# Patient Record
Sex: FEMALE | Race: WHITE | NOT HISPANIC OR LATINO | Employment: OTHER | URBAN - METROPOLITAN AREA
[De-identification: names, ages, dates, MRNs, and addresses within clinical notes are randomized per-mention and may not be internally consistent; named-entity substitution may affect disease eponyms.]

---

## 2019-11-18 ENCOUNTER — OFFICE VISIT (OUTPATIENT)
Dept: PRIMARY CARE CLINIC | Facility: CLINIC | Age: 69
End: 2019-11-18
Payer: COMMERCIAL

## 2019-11-18 VITALS
HEART RATE: 58 BPM | WEIGHT: 148 LBS | DIASTOLIC BLOOD PRESSURE: 80 MMHG | HEIGHT: 68 IN | SYSTOLIC BLOOD PRESSURE: 120 MMHG | BODY MASS INDEX: 22.43 KG/M2

## 2019-11-18 DIAGNOSIS — Z86.79 HISTORY OF SUPRAVENTRICULAR TACHYCARDIA: ICD-10-CM

## 2019-11-18 DIAGNOSIS — E03.9 HYPOTHYROIDISM (ACQUIRED): ICD-10-CM

## 2019-11-18 DIAGNOSIS — E78.2 HYPERLIPIDEMIA, MIXED: Primary | ICD-10-CM

## 2019-11-18 DIAGNOSIS — E55.9 VITAMIN D DEFICIENCY: ICD-10-CM

## 2019-11-18 DIAGNOSIS — Z79.890 POSTMENOPAUSAL HRT (HORMONE REPLACEMENT THERAPY): ICD-10-CM

## 2019-11-18 DIAGNOSIS — F33.0 MILD RECURRENT MAJOR DEPRESSION: ICD-10-CM

## 2019-11-18 PROCEDURE — 99999 PR PBB SHADOW E&M-NEW PATIENT-LVL III: ICD-10-PCS | Mod: PBBFAC,,, | Performed by: INTERNAL MEDICINE

## 2019-11-18 PROCEDURE — 99214 OFFICE O/P EST MOD 30 MIN: CPT | Mod: S$GLB,,, | Performed by: INTERNAL MEDICINE

## 2019-11-18 PROCEDURE — 99999 PR PBB SHADOW E&M-NEW PATIENT-LVL III: CPT | Mod: PBBFAC,,, | Performed by: INTERNAL MEDICINE

## 2019-11-18 PROCEDURE — 99214 PR OFFICE/OUTPT VISIT, EST, LEVL IV, 30-39 MIN: ICD-10-PCS | Mod: S$GLB,,, | Performed by: INTERNAL MEDICINE

## 2019-11-18 RX ORDER — ACETAMINOPHEN 500 MG
1 TABLET ORAL DAILY
COMMUNITY
Start: 2019-05-31

## 2019-11-18 RX ORDER — CITALOPRAM 10 MG/1
10 TABLET ORAL DAILY
Qty: 90 TABLET | Refills: 3 | Status: SHIPPED | OUTPATIENT
Start: 2019-11-18 | End: 2020-12-28 | Stop reason: SDUPTHER

## 2019-11-18 RX ORDER — NORETHINDRONE ACETATE AND ETHINYL ESTRADIOL 1; 5 MG/1; UG/1
TABLET ORAL
COMMUNITY
Start: 2019-10-24

## 2019-11-18 RX ORDER — METOPROLOL TARTRATE 100 MG/1
1 TABLET ORAL 2 TIMES DAILY
Refills: 3 | COMMUNITY
Start: 2019-10-03 | End: 2024-01-26

## 2019-11-18 RX ORDER — MOMETASONE FUROATE 1 MG/G
CREAM TOPICAL
COMMUNITY
Start: 2019-09-11

## 2019-11-18 RX ORDER — CITALOPRAM 10 MG/1
10 TABLET ORAL DAILY
COMMUNITY
End: 2019-11-18 | Stop reason: SDUPTHER

## 2019-11-18 RX ORDER — LEVOTHYROXINE SODIUM 25 UG/1
25 TABLET ORAL DAILY
Refills: 4 | COMMUNITY
Start: 2019-09-17 | End: 2020-03-09 | Stop reason: SDUPTHER

## 2019-11-18 RX ORDER — PRAVASTATIN SODIUM 40 MG/1
40 TABLET ORAL DAILY
COMMUNITY
Start: 2016-04-05

## 2019-11-18 NOTE — PROGRESS NOTES
[facility] Note    Chief Complaint      Chief Complaint   Patient presents with    Establish Care       History of Present Illness      Fifi Castano is a 69 y.o. female with chronic conditions of HLD, hx of SVT, hypothyroidism, depression, vit D deficiency who presents today for: establish care and review chronic conditions.    HLD: Contorlled on pravastatin.  Sees Dr. Ordonez.  .    Hx of SVT: Sees Dr. Ordonez.  On metoprolol for rate control.  No recent exacerbation.  Hypothyroidism: Will be seeing Dr. Nevarez since Dr. Wang retired.  Controlled on synthroid.  TSH due.  No new or worsening symptoms.  Depression: Controlled on citalopram.  Tried stopping to see if she still needs it.  After discontinuing, felt like she did better on it.    Vit D deficiency: Controlled on vit D supplement.  Will update labs.  Flu shot UTD.  Td unsure.  Prevnar, pneumovax unsure.  Shingles vaccine unsure.  Mammogram UTD with DR. Wang.  DEXA UTD.  Cscope UTD with DR. Pedro.      Past Medical History:  History reviewed. No pertinent past medical history.    Past Surgical History:   has no past surgical history on file.    Family History:  family history is not on file.     Social History:  Social History     Tobacco Use    Smoking status: Never Smoker    Smokeless tobacco: Never Used   Substance Use Topics    Alcohol use: Yes     Frequency: 2-4 times a month    Drug use: Not on file       Review of Systems   Constitutional: Negative for chills, fever and malaise/fatigue.   Respiratory: Negative for shortness of breath.    Cardiovascular: Negative for chest pain.   Gastrointestinal: Negative for constipation, diarrhea, nausea and vomiting.   Skin: Negative for rash.   Neurological: Negative for weakness.        Medications:  Outpatient Encounter Medications as of 11/18/2019   Medication Sig Dispense Refill    cholecalciferol, vitamin D3, (VITAMIN D3) 50 mcg (2,000 unit) Cap Take 1 capsule by mouth once  "daily.      citalopram (CELEXA) 10 MG tablet Take 1 tablet (10 mg total) by mouth once daily. 90 tablet 3    JINTELI 1-5 mg-mcg Tab       levothyroxine (SYNTHROID) 25 MCG tablet Take 25 mcg by mouth once daily.  4    metoprolol tartrate (LOPRESSOR) 100 MG tablet Take 1 tablet by mouth 2 (two) times daily.  3    pravastatin (PRAVACHOL) 20 MG tablet Take 1 tablet by mouth once daily.      [DISCONTINUED] citalopram (CELEXA) 10 MG tablet Take 10 mg by mouth once daily.      [DISCONTINUED] citalopram hydrobromide (CELEXA ORAL) Take 10 mg by mouth once daily.       mometasone 0.1% (ELOCON) 0.1 % cream        No facility-administered encounter medications on file as of 11/18/2019.        Allergies:  Review of patient's allergies indicates:  No Known Allergies    Health Maintenance:    There is no immunization history on file for this patient.   Health Maintenance   Topic Date Due    Hepatitis C Screening  1950    Lipid Panel  1950    TETANUS VACCINE  04/29/1970    Mammogram  04/29/1992    DEXA SCAN  04/29/1992    Colonoscopy  04/29/2002    Pneumococcal Vaccine (65+ Low/Medium Risk) (1 of 2 - PCV13) 04/29/2017        Physical Exam      Vital Signs  Pulse: (!) 58  BP: 120/80  Pain Score: 0-No pain  Height and Weight  Height: 5' 7.5" (171.5 cm)  Weight: 67.1 kg (148 lb)  BSA (Calculated - sq m): 1.79 sq meters  BMI (Calculated): 22.8  Weight in (lb) to have BMI = 25: 161.7]    Physical Exam   Constitutional: She appears well-developed and well-nourished.   HENT:   Head: Normocephalic and atraumatic.   Right Ear: External ear normal.   Left Ear: External ear normal.   Mouth/Throat: Oropharynx is clear and moist.   Eyes: Pupils are equal, round, and reactive to light. Conjunctivae and EOM are normal.   Neck: Carotid bruit is not present.   Cardiovascular: Normal rate, regular rhythm, normal heart sounds and intact distal pulses.   No murmur heard.  Pulmonary/Chest: Effort normal and breath sounds " normal. She has no wheezes. She has no rales.   Abdominal: Soft. Bowel sounds are normal. She exhibits no distension. There is no hepatosplenomegaly. There is no tenderness.   Musculoskeletal: She exhibits no edema.   Vitals reviewed.       Laboratory:  CBC:      CMP:        Invalid input(s): CREATININ  URINALYSIS:       LIPIDS:      TSH:      A1C:        Assessment/Plan     Fifi Castano is a 69 y.o.female with:    1. Hyperlipidemia, mixed  - TSH; Future  - T4, free; Future  - Basic metabolic panel; Future  - TSH  - T4, free  - Basic metabolic panel  Continue current meds.    2. Hypothyroidism (acquired)  - TSH; Future  - T4, free; Future  - Basic metabolic panel; Future  - TSH  - T4, free  - Basic metabolic panel  Continue current meds.    3. Mild recurrent major depression  - citalopram (CELEXA) 10 MG tablet; Take 1 tablet (10 mg total) by mouth once daily.  Dispense: 90 tablet; Refill: 3  Continue current meds.    4. Vitamin D deficiency  - Vitamin D; Future  - Vitamin D  Continue current meds.    5. Postmenopausal HRT (hormone replacement therapy)    6. History of supraventricular tachycardia       Chronic conditions status updated as per HPI.  Other than changes above, cont current medications and maintain follow up with specialists.  Return to clinic in 12 months.    Atif Duran MD  [facility]

## 2019-11-20 LAB
25(OH)D3 SERPL-MCNC: 34 NG/ML (ref 30–100)
ANION GAP SERPL CALC-SCNC: 17 MEQ/L (ref 9–18)
BUN BLD-MCNC: 17 MG/DL (ref 7–21)
BUN/CREAT SERPL: 24 RATIO (ref 6–22)
CALC OSMOLALITY: 280 MOSM/KG (ref 275–295)
CALCIUM SERPL-MCNC: 9.7 MG/DL (ref 8.5–10.3)
CHLORIDE SERPL-SCNC: 103 MEQ/L (ref 98–107)
CO2 SERPL-SCNC: 25 MEQ/L (ref 21–31)
CREAT SERPL-MCNC: 0.7 MG/DL (ref 0.5–1)
FREE T4: 1.3 NANOGRAM/DL (ref 0.9–1.7)
GFR: 84.9 ML/MIN/1.73M2
GLUCOSE SERPL-MCNC: 89 MG/DL (ref 70–100)
POTASSIUM SERPL-SCNC: 4.7 MEQ/L (ref 3.5–5)
SODIUM BLD-SCNC: 140 MEQ/L (ref 135–145)
TSH SERPL DL<=0.005 MIU/L-ACNC: 1.79 UIL/ML (ref 0.35–4)

## 2020-03-07 ENCOUNTER — PATIENT MESSAGE (OUTPATIENT)
Dept: PRIMARY CARE CLINIC | Facility: CLINIC | Age: 70
End: 2020-03-07

## 2020-03-09 RX ORDER — LEVOTHYROXINE SODIUM 25 UG/1
25 TABLET ORAL DAILY
Qty: 90 TABLET | Refills: 3 | Status: SHIPPED | OUTPATIENT
Start: 2020-03-09 | End: 2020-12-28 | Stop reason: SDUPTHER

## 2020-05-08 DIAGNOSIS — Z12.11 COLON CANCER SCREENING: ICD-10-CM

## 2020-05-08 DIAGNOSIS — Z12.39 BREAST CANCER SCREENING: ICD-10-CM

## 2020-10-05 ENCOUNTER — PATIENT MESSAGE (OUTPATIENT)
Dept: INTERNAL MEDICINE | Facility: CLINIC | Age: 70
End: 2020-10-05

## 2020-11-23 ENCOUNTER — PATIENT MESSAGE (OUTPATIENT)
Dept: PRIMARY CARE CLINIC | Facility: CLINIC | Age: 70
End: 2020-11-23

## 2020-11-23 RX ORDER — INFLUENZA A VIRUS A/MICHIGAN/45/2015 X-275 (H1N1) ANTIGEN (FORMALDEHYDE INACTIVATED), INFLUENZA A VIRUS A/SINGAPORE/INFIMH-16-0019/2016 IVR-186 (H3N2) ANTIGEN (FORMALDEHYDE INACTIVATED), INFLUENZA B VIRUS B/PHUKET/3073/2013 ANTIGEN (FORMALDEHYDE INACTIVATED), AND INFLUENZA B VIRUS B/MARYLAND/15/2016 BX-69A ANTIGEN (FORMALDEHYDE INACTIVATED) 60; 60; 60; 60 UG/.7ML; UG/.7ML; UG/.7ML; UG/.7ML
INJECTION, SUSPENSION INTRAMUSCULAR
COMMUNITY
Start: 2020-09-28 | End: 2024-01-26

## 2020-11-23 RX ORDER — CELECOXIB 200 MG/1
CAPSULE ORAL
COMMUNITY
Start: 2020-10-06 | End: 2024-01-26

## 2020-11-23 RX ORDER — TRETINOIN 0.25 MG/G
1 CREAM TOPICAL NIGHTLY
COMMUNITY
Start: 2020-09-28 | End: 2024-01-26

## 2020-11-23 RX ORDER — VALACYCLOVIR HYDROCHLORIDE 500 MG/1
TABLET, FILM COATED ORAL
COMMUNITY
Start: 2020-08-21

## 2020-12-08 LAB
CHOL/HDLC RATIO: 5
CHOLEST SERPL-MSCNC: 235 MG/DL (ref 0–200)
HDLC SERPL-MCNC: 49 MG/DL
LDLC SERPL CALC-MCNC: 134 MG/DL
NON HDL CHOL (CALC): 186
TRIGLYCERIDE (LIPID PAN): 322

## 2020-12-28 ENCOUNTER — OFFICE VISIT (OUTPATIENT)
Dept: PRIMARY CARE CLINIC | Facility: CLINIC | Age: 70
End: 2020-12-28
Payer: COMMERCIAL

## 2020-12-28 ENCOUNTER — PATIENT MESSAGE (OUTPATIENT)
Dept: PRIMARY CARE CLINIC | Facility: CLINIC | Age: 70
End: 2020-12-28

## 2020-12-28 VITALS
SYSTOLIC BLOOD PRESSURE: 123 MMHG | OXYGEN SATURATION: 98 % | DIASTOLIC BLOOD PRESSURE: 72 MMHG | HEART RATE: 75 BPM | RESPIRATION RATE: 18 BRPM

## 2020-12-28 DIAGNOSIS — Z79.890 POSTMENOPAUSAL HRT (HORMONE REPLACEMENT THERAPY): ICD-10-CM

## 2020-12-28 DIAGNOSIS — E03.9 HYPOTHYROIDISM (ACQUIRED): ICD-10-CM

## 2020-12-28 DIAGNOSIS — Z86.79 HISTORY OF SUPRAVENTRICULAR TACHYCARDIA: ICD-10-CM

## 2020-12-28 DIAGNOSIS — M25.571 CHRONIC PAIN OF RIGHT ANKLE: ICD-10-CM

## 2020-12-28 DIAGNOSIS — E55.9 VITAMIN D DEFICIENCY: ICD-10-CM

## 2020-12-28 DIAGNOSIS — G89.29 CHRONIC PAIN OF RIGHT ANKLE: ICD-10-CM

## 2020-12-28 DIAGNOSIS — F33.0 MILD RECURRENT MAJOR DEPRESSION: ICD-10-CM

## 2020-12-28 DIAGNOSIS — Z00.00 ANNUAL PHYSICAL EXAM: Primary | ICD-10-CM

## 2020-12-28 DIAGNOSIS — E78.2 HYPERLIPIDEMIA, MIXED: ICD-10-CM

## 2020-12-28 PROCEDURE — 1125F AMNT PAIN NOTED PAIN PRSNT: CPT | Mod: S$GLB,,, | Performed by: INTERNAL MEDICINE

## 2020-12-28 PROCEDURE — 3288F FALL RISK ASSESSMENT DOCD: CPT | Mod: CPTII,S$GLB,, | Performed by: INTERNAL MEDICINE

## 2020-12-28 PROCEDURE — 1125F PR PAIN SEVERITY QUANTIFIED, PAIN PRESENT: ICD-10-PCS | Mod: S$GLB,,, | Performed by: INTERNAL MEDICINE

## 2020-12-28 PROCEDURE — 1101F PR PT FALLS ASSESS DOC 0-1 FALLS W/OUT INJ PAST YR: ICD-10-PCS | Mod: CPTII,S$GLB,, | Performed by: INTERNAL MEDICINE

## 2020-12-28 PROCEDURE — 1101F PT FALLS ASSESS-DOCD LE1/YR: CPT | Mod: CPTII,S$GLB,, | Performed by: INTERNAL MEDICINE

## 2020-12-28 PROCEDURE — 99999 PR PBB SHADOW E&M-EST. PATIENT-LVL III: ICD-10-PCS | Mod: PBBFAC,,, | Performed by: INTERNAL MEDICINE

## 2020-12-28 PROCEDURE — 3288F PR FALLS RISK ASSESSMENT DOCUMENTED: ICD-10-PCS | Mod: CPTII,S$GLB,, | Performed by: INTERNAL MEDICINE

## 2020-12-28 PROCEDURE — 99397 PER PM REEVAL EST PAT 65+ YR: CPT | Mod: S$GLB,,, | Performed by: INTERNAL MEDICINE

## 2020-12-28 PROCEDURE — 99999 PR PBB SHADOW E&M-EST. PATIENT-LVL III: CPT | Mod: PBBFAC,,, | Performed by: INTERNAL MEDICINE

## 2020-12-28 PROCEDURE — 99397 PR PREVENTIVE VISIT,EST,65 & OVER: ICD-10-PCS | Mod: S$GLB,,, | Performed by: INTERNAL MEDICINE

## 2020-12-28 RX ORDER — CITALOPRAM 10 MG/1
10 TABLET ORAL DAILY
Qty: 90 TABLET | Refills: 3 | Status: SHIPPED | OUTPATIENT
Start: 2020-12-28 | End: 2022-03-11

## 2020-12-28 RX ORDER — LEVOTHYROXINE SODIUM 25 UG/1
25 TABLET ORAL DAILY
Qty: 90 TABLET | Refills: 3 | Status: SHIPPED | OUTPATIENT
Start: 2020-12-28 | End: 2022-03-31 | Stop reason: SDUPTHER

## 2020-12-28 NOTE — PROGRESS NOTES
[facility] Note    Chief Complaint      Chief Complaint   Patient presents with    Annual Exam       History of Present Illness      Fifi Castano is a 70 y.o. female with chronic conditions of HLD, hx of SVT, hypothyroidism, depression, vit D deficiency who presents today for: annual preventative visit.  Reports twisting her right ankle when descending stairs.  Had initial swelling and pain.  Resolved slightly but still having right lateral foot pain. Saw Dr. Phipps ortho, who ordered xrays (no fracture). Tried conservative measures and PT without improvement.  Has had MRI right ankle ordered, auth pending  HLD: Controlled on pravastatin.  Sees Dr. Ordonez.  LDL requested from Dr. Ordonez.  Dose recently increased.  Hx of SVT: Sees Dr. Ordonez.  On metoprolol for rate control.  No recent exacerbation.  Hypothyroidism: Sees Dr. Nevarez.  Has had recent labs.  No new or worsening symptoms.  Depression: Controlled on citalopram.    Vit D deficiency: Controlled on vit D supplement.  Will update labs.  Diet: Prepares own food mostly.  Started with ideal protein diet.  Drinks plenty water.  Exercise: Limited by right ankle pain.  Denies drinking and driving, drinking more than 4 drinks on occasion, drug use.     Flu shot UTD.  Td unsure.  Prevnar, pneumovax unsure.  Shingles vaccine unsure.  Mammogram UTD with DR. Wang.  DEXA UTD.  Cscope UTD with DR. Pedro.      Past Medical History:  History reviewed. No pertinent past medical history.    Past Surgical History:   has no past surgical history on file.    Family History:  family history is not on file.     Social History:  Social History     Tobacco Use    Smoking status: Never Smoker    Smokeless tobacco: Never Used   Substance Use Topics    Alcohol use: Yes     Frequency: 2-4 times a month     Drinks per session: 1 or 2     Binge frequency: Never    Drug use: Not on file       I personally reviewed all past medical, surgical, social and family  history.    Review of Systems   Constitutional: Negative for chills, fever and malaise/fatigue.   HENT: Negative for hearing loss.    Eyes: Negative for discharge.   Respiratory: Negative for shortness of breath and wheezing.    Cardiovascular: Negative for chest pain and palpitations.   Gastrointestinal: Negative for blood in stool, constipation, diarrhea, nausea and vomiting.   Genitourinary: Negative for dysuria and hematuria.   Musculoskeletal: Negative for neck pain.   Skin: Negative for rash.   Neurological: Negative for weakness and headaches.   Endo/Heme/Allergies: Negative for polydipsia.   All other systems reviewed and are negative.       Medications:  Outpatient Encounter Medications as of 12/28/2020   Medication Sig Dispense Refill    celecoxib (CELEBREX) 200 MG capsule       cholecalciferol, vitamin D3, (VITAMIN D3) 50 mcg (2,000 unit) Cap Take 1 capsule by mouth once daily.      citalopram (CELEXA) 10 MG tablet Take 1 tablet (10 mg total) by mouth once daily. 90 tablet 3    FLUZONE HIGHDOSE QUAD 20-21  mcg/0.7 mL Syrg PHARMACY ADMINISTERED      JINTELI 1-5 mg-mcg Tab       levothyroxine (SYNTHROID) 25 MCG tablet Take 1 tablet (25 mcg total) by mouth once daily. 90 tablet 3    metoprolol tartrate (LOPRESSOR) 100 MG tablet Take 1 tablet by mouth 2 (two) times daily.  3    pravastatin (PRAVACHOL) 40 MG tablet Take 40 mg by mouth once daily.       tretinoin (RETIN-A) 0.025 % cream 1 application every evening. At bedtime      valACYclovir (VALTREX) 500 MG tablet       [DISCONTINUED] citalopram (CELEXA) 10 MG tablet Take 1 tablet (10 mg total) by mouth once daily. 90 tablet 3    [DISCONTINUED] levothyroxine (SYNTHROID) 25 MCG tablet Take 1 tablet (25 mcg total) by mouth once daily. 90 tablet 3    mometasone 0.1% (ELOCON) 0.1 % cream        No facility-administered encounter medications on file as of 12/28/2020.        Allergies:  Review of patient's allergies indicates:  No Known  Allergies    Health Maintenance:  Immunization History   Administered Date(s) Administered    Influenza 10/11/2015, 10/25/2019, 09/28/2020      Health Maintenance   Topic Date Due    Hepatitis C Screening  1950    Lipid Panel  1950    TETANUS VACCINE  04/29/1970    Mammogram  04/29/1992    DEXA SCAN  04/29/1992    Pneumococcal Vaccine (65+ Low/Medium Risk) (1 of 2 - PCV13) 04/29/2017        Physical Exam      Vital Signs  Pulse: 75  Resp: 18  SpO2: 98 %  BP: 123/72  BP Location: Left arm  Patient Position: Sitting  Pain Score:   4]    Physical Exam  Vitals signs reviewed.   Constitutional:       Appearance: She is well-developed.   HENT:      Head: Normocephalic and atraumatic.      Right Ear: External ear normal.      Left Ear: External ear normal.   Eyes:      Conjunctiva/sclera: Conjunctivae normal.      Pupils: Pupils are equal, round, and reactive to light.   Neck:      Vascular: No carotid bruit.   Cardiovascular:      Rate and Rhythm: Normal rate and regular rhythm.      Heart sounds: Normal heart sounds. No murmur.   Pulmonary:      Effort: Pulmonary effort is normal.      Breath sounds: Normal breath sounds. No wheezing or rales.   Abdominal:      General: Bowel sounds are normal. There is no distension.      Palpations: Abdomen is soft.      Tenderness: There is no abdominal tenderness.          Laboratory:  CBC:      CMP:  Recent Labs   Lab 11/19/19  1233   Glucose 89   Calcium 9.7   Sodium 140   Potassium 4.7   CO2 25   Chloride 103   BUN 17     URINALYSIS:       LIPIDS:  Recent Labs   Lab 11/19/19  1233   TSH 1.79     TSH:  Recent Labs   Lab 11/19/19  1233   TSH 1.79     A1C:        Assessment/Plan     Fifi Castano is a 70 y.o.female with:    1. Annual physical exam  Discussed diet and exercise, vaccines and cancer screening, risk factors.  Screening labs requested  2. Hyperlipidemia, mixed  Continue current meds.  Requesting labs.  F/u with Dr. Monique  3. History of  supraventricular tachycardia  Continue current meds.  F/U with Dr. Ordonez.  4. Hypothyroidism (acquired)  - levothyroxine (SYNTHROID) 25 MCG tablet; Take 1 tablet (25 mcg total) by mouth once daily.  Dispense: 90 tablet; Refill: 3  Continue current meds.  Requesting labs.  5. Mild recurrent major depression  - citalopram (CELEXA) 10 MG tablet; Take 1 tablet (10 mg total) by mouth once daily.  Dispense: 90 tablet; Refill: 3  Continue current meds.    6. Postmenopausal HRT (hormone replacement therapy)  Continue current meds.    7. Vitamin D deficiency  Continue current meds.    8. Chronic pain of right ankle  F/U with Dr. Phipps for MRI authorization.  Discussed seeing Dr. Arroyo.    Chronic conditions status updated as per HPI.  Other than changes above, cont current medications and maintain follow up with specialists.  Return to clinic in 12 months.    Atif Duran MD  [facility]    Answers for HPI/ROS submitted by the patient on 12/26/2020   activity change: Yes  unexpected weight change: No  rhinorrhea: No  trouble swallowing: No  visual disturbance: No  chest tightness: No  polyuria: No  difficulty urinating: No  menstrual problem: No  joint swelling: No  arthralgias: No  confusion: No  dysphoric mood: No

## 2021-01-04 ENCOUNTER — PATIENT MESSAGE (OUTPATIENT)
Dept: ADMINISTRATIVE | Facility: HOSPITAL | Age: 71
End: 2021-01-04

## 2021-01-04 ENCOUNTER — TELEPHONE (OUTPATIENT)
Dept: ADMINISTRATIVE | Facility: HOSPITAL | Age: 71
End: 2021-01-04

## 2021-01-07 ENCOUNTER — PATIENT OUTREACH (OUTPATIENT)
Dept: ADMINISTRATIVE | Facility: HOSPITAL | Age: 71
End: 2021-01-07

## 2021-01-07 ENCOUNTER — TELEPHONE (OUTPATIENT)
Dept: ADMINISTRATIVE | Facility: HOSPITAL | Age: 71
End: 2021-01-07

## 2021-01-12 ENCOUNTER — PATIENT MESSAGE (OUTPATIENT)
Dept: PRIMARY CARE CLINIC | Facility: CLINIC | Age: 71
End: 2021-01-12

## 2021-01-12 ENCOUNTER — TELEPHONE (OUTPATIENT)
Dept: ADMINISTRATIVE | Facility: HOSPITAL | Age: 71
End: 2021-01-12

## 2021-01-14 ENCOUNTER — TELEPHONE (OUTPATIENT)
Dept: ADMINISTRATIVE | Facility: HOSPITAL | Age: 71
End: 2021-01-14

## 2021-01-14 ENCOUNTER — PATIENT OUTREACH (OUTPATIENT)
Dept: ADMINISTRATIVE | Facility: HOSPITAL | Age: 71
End: 2021-01-14

## 2021-01-15 ENCOUNTER — TELEPHONE (OUTPATIENT)
Dept: ADMINISTRATIVE | Facility: HOSPITAL | Age: 71
End: 2021-01-15

## 2021-02-21 ENCOUNTER — PATIENT MESSAGE (OUTPATIENT)
Dept: PRIMARY CARE CLINIC | Facility: CLINIC | Age: 71
End: 2021-02-21

## 2021-02-21 DIAGNOSIS — E03.9 HYPOTHYROIDISM (ACQUIRED): Primary | ICD-10-CM

## 2021-02-21 DIAGNOSIS — E55.9 VITAMIN D DEFICIENCY: ICD-10-CM

## 2021-02-22 ENCOUNTER — PATIENT MESSAGE (OUTPATIENT)
Dept: PRIMARY CARE CLINIC | Facility: CLINIC | Age: 71
End: 2021-02-22

## 2021-02-22 ENCOUNTER — TELEPHONE (OUTPATIENT)
Dept: PRIMARY CARE CLINIC | Facility: CLINIC | Age: 71
End: 2021-02-22

## 2021-03-09 ENCOUNTER — PATIENT MESSAGE (OUTPATIENT)
Dept: PRIMARY CARE CLINIC | Facility: CLINIC | Age: 71
End: 2021-03-09

## 2021-03-17 ENCOUNTER — PATIENT MESSAGE (OUTPATIENT)
Dept: PRIMARY CARE CLINIC | Facility: CLINIC | Age: 71
End: 2021-03-17

## 2021-03-17 LAB
CHOL/HDLC RATIO: 3
CHOLEST SERPL-MSCNC: 171 MG/DL (ref 0–200)
HDLC SERPL-MCNC: 50 MG/DL
LDLC SERPL CALC-MCNC: 102 MG/DL
NON-HDL CHOLESTEROL: 121
TRIGLYCERIDE (LIPID PAN): 121

## 2021-03-19 LAB
25(OH)D3 SERPL-MCNC: 53 NG/ML (ref 30–100)
FREE T4: 1.4 NANOGRAM/DL (ref 0.9–1.7)
TSH SERPL DL<=0.005 MIU/L-ACNC: 2.02 UIL/ML (ref 0.35–4)

## 2021-03-24 ENCOUNTER — PATIENT MESSAGE (OUTPATIENT)
Dept: FAMILY MEDICINE | Facility: CLINIC | Age: 71
End: 2021-03-24

## 2021-03-25 ENCOUNTER — TELEPHONE (OUTPATIENT)
Dept: ADMINISTRATIVE | Facility: HOSPITAL | Age: 71
End: 2021-03-25

## 2021-04-16 ENCOUNTER — PATIENT MESSAGE (OUTPATIENT)
Dept: RESEARCH | Facility: HOSPITAL | Age: 71
End: 2021-04-16

## 2021-09-23 ENCOUNTER — PATIENT MESSAGE (OUTPATIENT)
Dept: PRIMARY CARE CLINIC | Facility: CLINIC | Age: 71
End: 2021-09-23

## 2021-09-24 ENCOUNTER — PATIENT MESSAGE (OUTPATIENT)
Dept: PRIMARY CARE CLINIC | Facility: CLINIC | Age: 71
End: 2021-09-24

## 2021-09-24 RX ORDER — CITALOPRAM 20 MG/1
20 TABLET, FILM COATED ORAL DAILY
Qty: 90 TABLET | Refills: 3 | Status: SHIPPED | OUTPATIENT
Start: 2021-09-24 | End: 2022-08-20

## 2022-01-05 DIAGNOSIS — Z12.31 OTHER SCREENING MAMMOGRAM: ICD-10-CM

## 2022-01-10 ENCOUNTER — PATIENT MESSAGE (OUTPATIENT)
Dept: ADMINISTRATIVE | Facility: HOSPITAL | Age: 72
End: 2022-01-10
Payer: MEDICARE

## 2022-03-11 ENCOUNTER — TELEPHONE (OUTPATIENT)
Dept: INTERNAL MEDICINE | Facility: CLINIC | Age: 72
End: 2022-03-11
Payer: MEDICARE

## 2022-03-11 ENCOUNTER — OFFICE VISIT (OUTPATIENT)
Dept: INTERNAL MEDICINE | Facility: CLINIC | Age: 72
End: 2022-03-11
Payer: MEDICARE

## 2022-03-11 VITALS — HEART RATE: 59 BPM | SYSTOLIC BLOOD PRESSURE: 110 MMHG | OXYGEN SATURATION: 97 % | DIASTOLIC BLOOD PRESSURE: 70 MMHG

## 2022-03-11 DIAGNOSIS — F33.0 MILD RECURRENT MAJOR DEPRESSION: ICD-10-CM

## 2022-03-11 DIAGNOSIS — E55.9 VITAMIN D DEFICIENCY: ICD-10-CM

## 2022-03-11 DIAGNOSIS — Z86.79 HISTORY OF SUPRAVENTRICULAR TACHYCARDIA: ICD-10-CM

## 2022-03-11 DIAGNOSIS — Z79.890 POSTMENOPAUSAL HRT (HORMONE REPLACEMENT THERAPY): ICD-10-CM

## 2022-03-11 DIAGNOSIS — E03.9 HYPOTHYROIDISM (ACQUIRED): ICD-10-CM

## 2022-03-11 DIAGNOSIS — E78.2 HYPERLIPIDEMIA, MIXED: Primary | ICD-10-CM

## 2022-03-11 PROCEDURE — 99214 OFFICE O/P EST MOD 30 MIN: CPT | Mod: S$PBB,,, | Performed by: INTERNAL MEDICINE

## 2022-03-11 PROCEDURE — 99999 PR PBB SHADOW E&M-EST. PATIENT-LVL III: CPT | Mod: PBBFAC,,, | Performed by: INTERNAL MEDICINE

## 2022-03-11 PROCEDURE — 99214 PR OFFICE/OUTPT VISIT, EST, LEVL IV, 30-39 MIN: ICD-10-PCS | Mod: S$PBB,,, | Performed by: INTERNAL MEDICINE

## 2022-03-11 PROCEDURE — 99999 PR PBB SHADOW E&M-EST. PATIENT-LVL III: ICD-10-PCS | Mod: PBBFAC,,, | Performed by: INTERNAL MEDICINE

## 2022-03-11 PROCEDURE — 99213 OFFICE O/P EST LOW 20 MIN: CPT | Mod: PBBFAC | Performed by: INTERNAL MEDICINE

## 2022-03-11 NOTE — PROGRESS NOTES
[facility] Note    Chief Complaint      Chief Complaint   Patient presents with    Annual Exam       History of Present Illness      Fifi Castano is a 71 y.o. female with chronic conditions of HLD, hx of SVT, hypothyroidism, depression, vit D deficiency who presents today for: follow up chronic conditions.  HLD: Controlled on pravastatin.  Sees Dr. Ordonez.  LDL requested from Dr. Ordonez.  Dose recently increased.  Hx of SVT: Sees Dr. Ordonez.  Had   On metoprolol for rate control.  No recent exacerbation.  Hypothyroidism: Sees Dr. Nevarez.  Due for labs. No new or worsening symptoms.  Depression: Controlled on citalopram.    Vit D deficiency: Controlled on vit D supplement.  Labs deferred this year.  Diet: Prepares own food mostly.  Started with ideal protein diet.  Drinks plenty water.  Exercise: Limited by right ankle pain.  Denies drinking and driving, drinking more than 4 drinks on occasion, drug use.     Flu shot UTD.  Td unsure.  Prevnar, pneumovax unsure.  Shingles vaccine unsure.  Mammogram UTD with Dr. Nevarez.  DEXA UTD.  Cscope due with Dr. Coffey, previously with Dr. Pedro.     Past Medical History:  History reviewed. No pertinent past medical history.    Past Surgical History:   has a past surgical history that includes colonoscopy (11/02/2016).    Family History:  family history is not on file.     Social History:  Social History     Tobacco Use    Smoking status: Never Smoker    Smokeless tobacco: Never Used   Substance Use Topics    Alcohol use: Yes       I personally reviewed all past medical, surgical, social and family history.    Review of Systems   Constitutional: Negative for chills, fever and malaise/fatigue.   HENT: Negative for hearing loss.    Eyes: Negative for discharge.   Respiratory: Negative for shortness of breath and wheezing.    Cardiovascular: Positive for palpitations. Negative for chest pain.   Gastrointestinal: Negative for blood in stool, constipation,  diarrhea, nausea and vomiting.   Genitourinary: Negative for dysuria and hematuria.   Musculoskeletal: Negative for neck pain.   Skin: Negative for rash.   Neurological: Negative for weakness and headaches.   Endo/Heme/Allergies: Negative for polydipsia.   All other systems reviewed and are negative.       Medications:  Outpatient Encounter Medications as of 3/11/2022   Medication Sig Dispense Refill    citalopram (CELEXA) 20 MG tablet Take 1 tablet (20 mg total) by mouth once daily. 90 tablet 3    levothyroxine (SYNTHROID) 25 MCG tablet Take 1 tablet (25 mcg total) by mouth once daily. 90 tablet 3    metoprolol tartrate (LOPRESSOR) 100 MG tablet Take 1 tablet by mouth 2 (two) times daily.  3    pravastatin (PRAVACHOL) 40 MG tablet Take 40 mg by mouth once daily.       celecoxib (CELEBREX) 200 MG capsule       cholecalciferol, vitamin D3, (VITAMIN D3) 50 mcg (2,000 unit) Cap Take 1 capsule by mouth once daily.      FLUZONE HIGHDOSE QUAD 20-21  mcg/0.7 mL Syrg PHARMACY ADMINISTERED      JINTELI 1-5 mg-mcg Tab       mometasone 0.1% (ELOCON) 0.1 % cream       tretinoin (RETIN-A) 0.025 % cream 1 application every evening. At bedtime      valACYclovir (VALTREX) 500 MG tablet       [DISCONTINUED] citalopram (CELEXA) 10 MG tablet Take 1 tablet (10 mg total) by mouth once daily. (Patient not taking: Reported on 3/11/2022) 90 tablet 3     No facility-administered encounter medications on file as of 3/11/2022.       Allergies:  Review of patient's allergies indicates:  No Known Allergies    Health Maintenance:  Immunization History   Administered Date(s) Administered    Influenza 10/11/2015, 10/25/2019, 09/28/2020      Health Maintenance   Topic Date Due    Hepatitis C Screening  Never done    TETANUS VACCINE  Never done    Mammogram  12/15/2021    DEXA Scan  12/15/2023    Lipid Panel  03/17/2026        Physical Exam      Vital Signs  Pulse: (!) 59  SpO2: 97 %  BP: 110/70  BP Location: Left  arm  Patient Position: Sitting  Pain Score: 0-No pain]    Physical Exam  Vitals reviewed.   Constitutional:       Appearance: She is well-developed.   HENT:      Head: Normocephalic and atraumatic.      Right Ear: External ear normal.      Left Ear: External ear normal.   Cardiovascular:      Rate and Rhythm: Normal rate and regular rhythm.      Heart sounds: Normal heart sounds. No murmur heard.  Pulmonary:      Effort: Pulmonary effort is normal.      Breath sounds: Normal breath sounds. No wheezing or rales.   Abdominal:      General: Bowel sounds are normal. There is no distension.      Palpations: Abdomen is soft.      Tenderness: There is no abdominal tenderness.          Laboratory:  CBC:      CMP:  Recent Labs   Lab 11/19/19  1233   Glucose 89   Calcium 9.7   Sodium 140   Potassium 4.7   CO2 25   Chloride 103   BUN 17     URINALYSIS:       LIPIDS:  Recent Labs   Lab 11/19/19  1233 12/08/20  0000 03/17/21  0000 03/18/21  1219   TSH 1.79  --   --  2.02   HDL  --  49 50  --    Cholesterol  --  235 A 171  --    LDL Cholesterol  --  134 102  --      TSH:  Recent Labs   Lab 11/19/19  1233 03/18/21  1219   TSH 1.79 2.02     A1C:        Assessment/Plan     Fifi Castano is a 71 y.o.female with:    1. Hyperlipidemia, mixed  Continue current meds.    2. Hypothyroidism (acquired)  - T4, Free; Future  - TSH; Future  - T4, Free  - TSH  Continue current meds.  Update labs.  3. Mild recurrent major depression  Continue current meds.    4. History of supraventricular tachycardia  Continue current meds.  F/U with cardiology  5. Vitamin D deficiency  Cont supplement.    6. Postmenopausal HRT (hormone replacement therapy)  - CBC Auto Differential; Future  - CBC Auto Differential       Chronic conditions status updated as per HPI.  Other than changes above, cont current medications and maintain follow up with specialists.  Follow up in about 1 year (around 3/11/2023) for Follow up visit.    No future appointments.    Atif  TRAVON Duran MD  [facility]                  Answers for HPI/ROS submitted by the patient on 3/9/2022  activity change: No  unexpected weight change: No  rhinorrhea: No  trouble swallowing: No  visual disturbance: No  chest tightness: No  polyuria: No  difficulty urinating: No  menstrual problem: No  joint swelling: No  arthralgias: No  confusion: No  dysphoric mood: No

## 2022-03-15 ENCOUNTER — PATIENT OUTREACH (OUTPATIENT)
Dept: ADMINISTRATIVE | Facility: HOSPITAL | Age: 72
End: 2022-03-15
Payer: MEDICARE

## 2022-03-15 NOTE — PROGRESS NOTES
Health Maintenance Due   Topic Date Due    Hepatitis C Screening  Never done    COVID-19 Vaccine (1) Never done    TETANUS VACCINE  Never done    Shingles Vaccine (1 of 2) Never done    Mammogram  12/15/2021          updated.  Immunizations reconciled.  Colonoscopy results scanned into chart.     Sarah Burkett LPN   Clinical Care Coordinator  [facility]

## 2022-03-18 LAB
BASOPHILS # BLD AUTO: 31 CELLS/UL (ref 0–200)
BASOPHILS NFR BLD AUTO: 0.5 %
EOSINOPHIL # BLD AUTO: 61 CELLS/UL (ref 15–500)
EOSINOPHIL NFR BLD AUTO: 1 %
ERYTHROCYTE [DISTWIDTH] IN BLOOD BY AUTOMATED COUNT: 11.9 % (ref 11–15)
HCT VFR BLD AUTO: 41.2 % (ref 35–45)
HGB BLD-MCNC: 13.9 G/DL (ref 11.7–15.5)
LYMPHOCYTES # BLD AUTO: 1434 CELLS/UL (ref 850–3900)
LYMPHOCYTES NFR BLD AUTO: 23.5 %
MCH RBC QN AUTO: 32.2 PG (ref 27–33)
MCHC RBC AUTO-ENTMCNC: 33.7 G/DL (ref 32–36)
MCV RBC AUTO: 95.4 FL (ref 80–100)
MONOCYTES # BLD AUTO: 622 CELLS/UL (ref 200–950)
MONOCYTES NFR BLD AUTO: 10.2 %
NEUTROPHILS # BLD AUTO: 3953 CELLS/UL (ref 1500–7800)
NEUTROPHILS NFR BLD AUTO: 64.8 %
PLATELET # BLD AUTO: 179 THOUSAND/UL (ref 140–400)
PMV BLD REES-ECKER: 10.2 FL (ref 7.5–12.5)
RBC # BLD AUTO: 4.32 MILLION/UL (ref 3.8–5.1)
T4 FREE SERPL-MCNC: 1.1 NG/DL (ref 0.8–1.8)
TSH SERPL-ACNC: 1.85 MIU/L (ref 0.4–4.5)
WBC # BLD AUTO: 6.1 THOUSAND/UL (ref 3.8–10.8)

## 2022-03-31 DIAGNOSIS — E03.9 HYPOTHYROIDISM (ACQUIRED): ICD-10-CM

## 2022-03-31 RX ORDER — LEVOTHYROXINE SODIUM 25 UG/1
25 TABLET ORAL DAILY
Qty: 90 TABLET | Refills: 3 | Status: SHIPPED | OUTPATIENT
Start: 2022-03-31 | End: 2023-03-06 | Stop reason: SDUPTHER

## 2022-03-31 NOTE — TELEPHONE ENCOUNTER
No new care gaps identified.  Powered by [employer]. Reference number: [id number].   3/31/2022 9:59:22 AM CDT

## 2022-05-30 ENCOUNTER — PATIENT MESSAGE (OUTPATIENT)
Dept: ADMINISTRATIVE | Facility: HOSPITAL | Age: 72
End: 2022-05-30
Payer: MEDICARE

## 2022-06-01 ENCOUNTER — PATIENT OUTREACH (OUTPATIENT)
Dept: ADMINISTRATIVE | Facility: HOSPITAL | Age: 72
End: 2022-06-01
Payer: MEDICARE

## 2022-06-01 NOTE — PROGRESS NOTES
Health Maintenance Due   Topic Date Due    Hepatitis C Screening  Never done    COVID-19 Vaccine (1) Never done    TETANUS VACCINE  Never done    Shingles Vaccine (1 of 2) Never done    Mammogram  12/15/2021     Triggered LINKS. Updated Care Everywhere. Record request sent to Dr. Simone Monroy asking for a copy of pt's most recent mammography results. Chart review completed.

## 2022-06-01 NOTE — LETTER
AUTHORIZATION FOR RELEASE OF   CONFIDENTIAL INFORMATION    Dear Dr. Simone Monroy,    We are seeing Fifi Castano, date of birth 1950, in the clinic at [facility]. Atif Duran MD is the patient's PCP. Fifi Castano has an outstanding lab/procedure at the time we reviewed her chart. In order to help keep her health information updated, she has authorized us to request the following medical record(s):        ( X )  MAMMOGRAM                                   (  )  COLONOSCOPY      (  )  PAP SMEAR                                          (  )  OUTSIDE LAB RESULTS     (  )  DEXA SCAN                                          (  )  EYE EXAM            (  )  FOOT EXAM                                          (  )  ENTIRE RECORD     (  )  OUTSIDE IMMUNIZATIONS                 (  )  _______________         Please fax records to [facility], David M Klibert, MD, [phone]     If you have any questions, please contact YAYO Flores at [phone].           Patient Name: Fifi Castano  : 1950  Patient Phone #: [phone]

## 2022-07-11 ENCOUNTER — PATIENT MESSAGE (OUTPATIENT)
Dept: ADMINISTRATIVE | Facility: HOSPITAL | Age: 72
End: 2022-07-11
Payer: MEDICARE

## 2022-08-03 ENCOUNTER — PATIENT MESSAGE (OUTPATIENT)
Dept: ADMINISTRATIVE | Facility: HOSPITAL | Age: 72
End: 2022-08-03
Payer: MEDICARE

## 2022-08-16 ENCOUNTER — PATIENT OUTREACH (OUTPATIENT)
Dept: ADMINISTRATIVE | Facility: HOSPITAL | Age: 72
End: 2022-08-16
Payer: MEDICARE

## 2022-08-16 NOTE — PROGRESS NOTES
Health Maintenance Due   Topic Date Due    Hepatitis C Screening  Never done    COVID-19 Vaccine (1) Never done    TETANUS VACCINE  Never done    Shingles Vaccine (1 of 2) Never done     Triggered LINKS. Updated Care Everywhere. Imported outside mammography results from Dr. Monroy into . Chart review completed.

## 2022-08-16 NOTE — PROGRESS NOTES
Health Maintenance Due   Topic Date Due    Hepatitis C Screening  Never done    COVID-19 Vaccine (1) Never done    TETANUS VACCINE  Never done    Shingles Vaccine (1 of 2) Never done    Mammogram  12/15/2021     Chart review done. HM updated. Immunizations reviewed & updated. Care Everywhere updated.  DERICK sent to Dr. Monroy and [facility] for most recent mammogram results.

## 2022-08-16 NOTE — LETTER
AUTHORIZATION FOR RELEASE OF   CONFIDENTIAL INFORMATION    Dear Dr. Jeff,    We are seeing Fifi Castano, date of birth 1950, in the clinic at [facility]. Atif Duran MD is the patient's PCP. Fifi Castano has an outstanding lab/procedure at the time we reviewed her chart. In order to help keep her health information updated, she has authorized us to request the following medical record(s):        ( X )  MAMMOGRAM                                      (  )  COLONOSCOPY      (  )  PAP SMEAR                                          (  )  OUTSIDE LAB RESULTS     (  )  DEXA SCAN                                          (  )  EYE EXAM            (  )  FOOT EXAM                                          (  )  ENTIRE RECORD     (  )  OUTSIDE IMMUNIZATIONS                 (  )  _______________         Please fax records to Atif Duran MD, [phone].     If you have any questions, please contact Nuha August LPN at [phone].           Patient Name: Fifi Castano  : 1950  Patient Phone #: [phone]

## 2022-08-20 RX ORDER — CITALOPRAM 20 MG/1
TABLET, FILM COATED ORAL
Qty: 90 TABLET | Refills: 1 | Status: SHIPPED | OUTPATIENT
Start: 2022-08-20 | End: 2024-01-26 | Stop reason: SDUPTHER

## 2022-08-20 NOTE — TELEPHONE ENCOUNTER
No new care gaps identified.  [facility] Embedded Care Gaps. Reference number: [id number]. 8/20/2022   8:49:28 AM GIANT

## 2022-08-21 NOTE — TELEPHONE ENCOUNTER
Refill Decision Note   Fifi Castano  is requesting a refill authorization.  Brief Assessment and Rationale for Refill:  Approve     Medication Therapy Plan:       Medication Reconciliation Completed: No   Comments:     No Care Gaps recommended.     Note composed:7:00 PM 08/20/2022

## 2023-01-24 ENCOUNTER — OFFICE VISIT (OUTPATIENT)
Dept: INTERNAL MEDICINE | Facility: CLINIC | Age: 73
End: 2023-01-24
Payer: MEDICARE

## 2023-01-24 VITALS — OXYGEN SATURATION: 98 % | HEART RATE: 64 BPM | SYSTOLIC BLOOD PRESSURE: 126 MMHG | DIASTOLIC BLOOD PRESSURE: 72 MMHG

## 2023-01-24 DIAGNOSIS — F33.0 MILD RECURRENT MAJOR DEPRESSION: ICD-10-CM

## 2023-01-24 DIAGNOSIS — Z79.890 POSTMENOPAUSAL HRT (HORMONE REPLACEMENT THERAPY): ICD-10-CM

## 2023-01-24 DIAGNOSIS — E78.2 HYPERLIPIDEMIA, MIXED: Primary | ICD-10-CM

## 2023-01-24 DIAGNOSIS — E03.9 HYPOTHYROIDISM (ACQUIRED): ICD-10-CM

## 2023-01-24 DIAGNOSIS — I47.10 SVT (SUPRAVENTRICULAR TACHYCARDIA): ICD-10-CM

## 2023-01-24 PROCEDURE — 99999 PR PBB SHADOW E&M-EST. PATIENT-LVL III: CPT | Mod: PBBFAC,,, | Performed by: INTERNAL MEDICINE

## 2023-01-24 PROCEDURE — 99213 OFFICE O/P EST LOW 20 MIN: CPT | Mod: PBBFAC | Performed by: INTERNAL MEDICINE

## 2023-01-24 PROCEDURE — 99999 PR PBB SHADOW E&M-EST. PATIENT-LVL III: ICD-10-PCS | Mod: PBBFAC,,, | Performed by: INTERNAL MEDICINE

## 2023-01-24 PROCEDURE — 99214 PR OFFICE/OUTPT VISIT, EST, LEVL IV, 30-39 MIN: ICD-10-PCS | Mod: S$PBB,,, | Performed by: INTERNAL MEDICINE

## 2023-01-24 PROCEDURE — 99214 OFFICE O/P EST MOD 30 MIN: CPT | Mod: S$PBB,,, | Performed by: INTERNAL MEDICINE

## 2023-01-24 NOTE — PROGRESS NOTES
[facility] Note    Chief Complaint      Chief Complaint   Patient presents with    Annual Exam       History of Present Illness      Fifi Castano is a 72 y.o. female with chronic conditions of HLD, SVT, hypothyroidism, depression, vit D deficiency who presents today for: follow up chronic conditions.   HLD: Controlled on pravastatin.  Sees Dr. Ordonez.  .    SVT: Sees Dr. Ordonez. On metoprolol for rate control.  Has tried sequentially increasing dose to avoid tachycardia but feels more lethargic at higher doses.  Saw Dr. Rosas recently to discuss ablation.    Hypothyroidism: Sees Dr. Nevarez. On synthroid.  No new or worsening symptoms.  Depression: Controlled on citalopram.    Vit D deficiency: Controlled on vit D supplement.  Labs deferred this year.  Diet: Prepares own food mostly.  Started with ideal protein diet.  Drinks plenty water.  Exercise: Limited by right ankle pain.  Denies drinking and driving, drinking more than 4 drinks on occasion, drug use.     Flu shot UTD.  Td unsure.  Prevnar, pneumovax UTD 2021 here and 2018 in [place].  Shingles vaccine discussed.  Has had zostavax.  Mammogram UTD with Dr. Nevarez.  DEXA UTD.  Cscope UTD 2022 with Dr. Coffey, polyps, 3 yr interval recommended.     Past Medical History:  History reviewed. No pertinent past medical history.    Past Surgical History:   has a past surgical history that includes colonoscopy (11/02/2016); Tonsillectomy (1959 ?); Joint replacement (7/2018); and Cosmetic surgery (2011).    Family History:  family history includes Alcohol abuse in her father; Cancer in her father and mother; Hearing loss in her mother.     Social History:  Social History     Tobacco Use    Smoking status: Never    Smokeless tobacco: Never   Substance Use Topics    Alcohol use: Yes     Alcohol/week: 1.0 standard drink     Types: 1 Drinks containing 0.5 oz of alcohol per week     Comment: 1 Martini    Drug use: Never       I personally reviewed all  past medical, surgical, social and family history.    Review of Systems   Constitutional:  Negative for chills, fever and malaise/fatigue.   HENT:  Negative for hearing loss.    Eyes:  Negative for discharge.   Respiratory:  Negative for shortness of breath and wheezing.    Cardiovascular:  Positive for palpitations. Negative for chest pain.   Gastrointestinal:  Negative for blood in stool, constipation, diarrhea, nausea and vomiting.   Genitourinary:  Negative for dysuria and hematuria.   Musculoskeletal:  Negative for neck pain.   Skin:  Negative for rash.   Neurological:  Negative for weakness and headaches.   Endo/Heme/Allergies:  Negative for polydipsia.   All other systems reviewed and are negative.     Medications:  Outpatient Encounter Medications as of 1/24/2023   Medication Sig Dispense Refill    celecoxib (CELEBREX) 200 MG capsule       cholecalciferol, vitamin D3, (VITAMIN D3) 50 mcg (2,000 unit) Cap Take 1 capsule by mouth once daily.      citalopram (CELEXA) 20 MG tablet TAKE 1 TABLET BY MOUTH EVERY DAY 90 tablet 1    FLUZONE HIGHDOSE QUAD 20-21  mcg/0.7 mL Syrg PHARMACY ADMINISTERED      JINTELI 1-5 mg-mcg Tab       levothyroxine (SYNTHROID) 25 MCG tablet Take 1 tablet (25 mcg total) by mouth once daily. 90 tablet 3    metoprolol tartrate (LOPRESSOR) 100 MG tablet Take 1 tablet by mouth 2 (two) times daily.  3    mometasone 0.1% (ELOCON) 0.1 % cream       pravastatin (PRAVACHOL) 40 MG tablet Take 40 mg by mouth once daily.       tretinoin (RETIN-A) 0.025 % cream 1 application every evening. At bedtime      valACYclovir (VALTREX) 500 MG tablet        No facility-administered encounter medications on file as of 1/24/2023.       Allergies:  Review of patient's allergies indicates:  No Known Allergies    Health Maintenance:  Immunization History   Administered Date(s) Administered    Influenza 10/11/2015, 10/25/2019, 09/28/2020, 10/10/2022    Influenza - Quadrivalent - High Dose - PF (65 years and  older) 09/28/2020, 10/10/2021    Influenza - Quadrivalent - PF *Preferred* (6 months and older) 10/11/2015    Pneumococcal Conjugate - 13 Valent 10/10/2021      Health Maintenance   Topic Date Due    Hepatitis C Screening  Never done    TETANUS VACCINE  Never done    DEXA Scan  12/15/2023    Mammogram  01/24/2024    Lipid Panel  01/19/2028        Physical Exam      Vital Signs  Pulse: 64  SpO2: 98 %  BP: 126/72  BP Location: Left arm  Patient Position: Sitting  Pain Score: 0-No pain]    Physical Exam  Vitals reviewed.   Constitutional:       Appearance: She is well-developed.   HENT:      Head: Normocephalic and atraumatic.      Right Ear: External ear normal.      Left Ear: External ear normal.   Cardiovascular:      Rate and Rhythm: Normal rate and regular rhythm.      Heart sounds: Normal heart sounds. No murmur heard.  Pulmonary:      Effort: Pulmonary effort is normal.      Breath sounds: Normal breath sounds. No wheezing or rales.   Abdominal:      General: Bowel sounds are normal. There is no distension.      Palpations: Abdomen is soft.      Tenderness: There is no abdominal tenderness.        Laboratory:  CBC:  Recent Labs   Lab 03/17/22  1332   WBC 6.1   RBC 4.32   Hemoglobin 13.9   Hematocrit 41.2   Platelets 179   MCV 95.4   MCH 32.2   MCHC 33.7     CMP:        Invalid input(s): CREATININ  URINALYSIS:       LIPIDS:  Recent Labs   Lab 03/17/21  0000 03/18/21  1219 03/17/22  1332 10/01/22  0946 01/19/23  0903   TSH  --  2.02 1.85  --   --    HDL 50  --   --  60 61   Cholesterol 171  --   --  210 H 210 H   Triglycerides  --   --   --  279 H 278 H   LDL Calculated  --   --   --  105 105   LDL Cholesterol 102  --   --   --   --    Hdl/Cholesterol Ratio  --   --   --  3.50 3.44     TSH:  Recent Labs   Lab 03/18/21  1219 03/17/22  1332   TSH 2.02 1.85     A1C:        Assessment/Plan     Fifi Castano is a 72 y.o.female with:    1. Hyperlipidemia, mixed  Continue current meds.    2. Hypothyroidism  (acquired)  Continue current meds.    3. Mild recurrent major depression  Continue current meds.    4. Postmenopausal HRT (hormone replacement therapy)  Continue current meds.    5. SVT (supraventricular tachycardia)  Continue current meds.  F/U with EP cardiology.      Chronic conditions status updated as per HPI.  Other than changes above, cont current medications and maintain follow up with specialists.  Follow up in about 1 year (around 1/24/2024) for Annual preventative visit.    No future appointments.    Atif Duran MD  [facility]      Answers submitted by the patient for this visit:  Review of Systems Questionnaire (Submitted on 1/21/2023)  activity change: Yes  unexpected weight change: No  rhinorrhea: No  trouble swallowing: No  visual disturbance: No  chest tightness: No  polyuria: No  difficulty urinating: No  menstrual problem: No  joint swelling: No  arthralgias: No  confusion: No  dysphoric mood: No

## 2023-05-15 ENCOUNTER — PES CALL (OUTPATIENT)
Dept: ADMINISTRATIVE | Facility: CLINIC | Age: 73
End: 2023-05-15
Payer: MEDICARE

## 2023-07-05 ENCOUNTER — PES CALL (OUTPATIENT)
Dept: ADMINISTRATIVE | Facility: CLINIC | Age: 73
End: 2023-07-05
Payer: MEDICARE

## 2023-08-19 ENCOUNTER — OFFICE VISIT (OUTPATIENT)
Dept: URGENT CARE | Facility: CLINIC | Age: 73
End: 2023-08-19
Payer: MEDICARE

## 2023-08-19 VITALS
SYSTOLIC BLOOD PRESSURE: 147 MMHG | BODY MASS INDEX: 22.76 KG/M2 | OXYGEN SATURATION: 98 % | DIASTOLIC BLOOD PRESSURE: 83 MMHG | RESPIRATION RATE: 19 BRPM | WEIGHT: 145 LBS | HEART RATE: 65 BPM | TEMPERATURE: 99 F | HEIGHT: 67 IN

## 2023-08-19 DIAGNOSIS — R82.90 CLOUDY URINE: ICD-10-CM

## 2023-08-19 DIAGNOSIS — R35.0 URINARY FREQUENCY: ICD-10-CM

## 2023-08-19 DIAGNOSIS — B37.31 VAGINAL YEAST INFECTION: Primary | ICD-10-CM

## 2023-08-19 LAB
BILIRUB UR QL STRIP: NEGATIVE
GLUCOSE UR QL STRIP: NEGATIVE
KETONES UR QL STRIP: NEGATIVE
LEUKOCYTE ESTERASE UR QL STRIP: POSITIVE
PH, POC UA: 6.5 (ref 5–8)
POC BLOOD, URINE: POSITIVE
POC NITRATES, URINE: NEGATIVE
PROT UR QL STRIP: NEGATIVE
SP GR UR STRIP: 1.01 (ref 1–1.03)
UROBILINOGEN UR STRIP-ACNC: NORMAL (ref 0.1–1.1)

## 2023-08-19 PROCEDURE — 87186 SC STD MICRODIL/AGAR DIL: CPT | Performed by: PHYSICIAN ASSISTANT

## 2023-08-19 PROCEDURE — 87088 URINE BACTERIA CULTURE: CPT | Performed by: PHYSICIAN ASSISTANT

## 2023-08-19 PROCEDURE — 81003 URINALYSIS AUTO W/O SCOPE: CPT | Mod: QW,S$GLB,, | Performed by: PHYSICIAN ASSISTANT

## 2023-08-19 PROCEDURE — 87077 CULTURE AEROBIC IDENTIFY: CPT | Performed by: PHYSICIAN ASSISTANT

## 2023-08-19 PROCEDURE — 87086 URINE CULTURE/COLONY COUNT: CPT | Performed by: PHYSICIAN ASSISTANT

## 2023-08-19 PROCEDURE — 99203 PR OFFICE/OUTPT VISIT, NEW, LEVL III, 30-44 MIN: ICD-10-PCS | Mod: S$GLB,,, | Performed by: PHYSICIAN ASSISTANT

## 2023-08-19 PROCEDURE — 99203 OFFICE O/P NEW LOW 30 MIN: CPT | Mod: S$GLB,,, | Performed by: PHYSICIAN ASSISTANT

## 2023-08-19 PROCEDURE — 81003 POCT URINALYSIS, DIPSTICK, AUTOMATED, W/O SCOPE: ICD-10-PCS | Mod: QW,S$GLB,, | Performed by: PHYSICIAN ASSISTANT

## 2023-08-19 RX ORDER — FLUCONAZOLE 150 MG/1
150 TABLET ORAL ONCE
Qty: 1 TABLET | Refills: 0 | Status: SHIPPED | OUTPATIENT
Start: 2023-08-19 | End: 2023-08-19

## 2023-08-19 NOTE — PATIENT INSTRUCTIONS
PLEASE READ YOUR DISCHARGE INSTRUCTIONS ENTIRELY AS IT CONTAINS IMPORTANT INFORMATION.  You were prescribed 1 dose of Diflucan for vaginal yeast.  We discussed drug interaction with Diflucan and citalopram which can cause cardiac arrhythmias.  Avoid concomitant use.    - A urine culture was sent. You will be contacted once it results and appropriate action will be taken if needed.  We will notify patient of the results in the next 3-5 days; can receive results on [facility].   - Take the pyridium three times a day with meals for 3 days if you have bladder or urinary pain. It will turn your urine orange. You do not need to take the whole prescription you can stop this once the pain is better and finish out the antibiotics  - Drink plenty of fluids, wipe front to back, take showers not baths, no scented soaps, wear breathable cotton underwear, urinate after sexual intercourse.   - Tylenol or Ibuprofen as directed as needed for pain.      -You must understand that you've received an Urgent Care treatment only and that you may be released before all your medical problems are known or treated. You, the patient, will arrange for follow up care as instructed. Please arrange follow up with your primary medical clinic within 2-5 days if your signs and symptoms have not resolved or worsen. Please go to the ER for worsening symptoms including worsening fever, flank pain, vomiting, unable to tolerate fluids, fatigue, etc.   - Follow up with your PCP or specialty clinic as directed in next 2-5 days for re-evaluation.  You can call [phone] to schedule an appointment with the appropriate provider.    - If your condition worsens or fails to improve we recommend that you receive another evaluation at the emergency room immediately or contact your primary medical clinic to discuss your concerns.     RED FLAGS/WARNING SYMPTOMS DISCUSSED WITH PATIENT THAT WOULD WARRANT EMERGENT MEDICAL ATTENTION. Patient aware and verbalized  "understanding.        Bladder Infection, Female (Adult)    Urine is normally doesn't have any bacteria in it. But bacteria can get into the urinary tract from the skin around the rectum. Or they can travel in the blood from elsewhere in the body. Once they are in your urinary tract, they can cause infection in the urethra (urethritis), the bladder (cystitis), or the kidneys (pyelonephritis).  The most common place for an infection is in the bladder. This is called a bladder infection. This is one of the most common infections in women. Most bladder infections are easily treated. They are not serious unless the infection spreads to the kidney.  The phrases "bladder infection," "UTI," and "cystitis" are often used to describe the same thing. But they are not always the same. Cystitis is an inflammation of the bladder. The most common cause of cystitis is an infection.  Symptoms  The infection causes inflammation in the urethra and bladder. This causes many of the symptoms. The most common symptoms of a bladder infection are:  Pain or burning when urinating  Having to urinate more often than usual  Urgent need to urinate  Only a small amount of urine comes out  Blood in urine  Abdominal discomfort. This is usually in the lower abdomen above the pubic bone.  Cloudy urine  Strong- or bad-smelling urine  Unable to urinate (urinary retention)  Unable to hold urine in (urinary incontinence)  Fever  Loss of appetite  Confusion (in older adults)  Causes  Bladder infections are not contagious. You can't get one from someone else, from a toilet seat, or from sharing a bath.  The most common cause of bladder infections is bacteria from the bowels. The bacteria get onto the skin around the opening of the urethra. From there, they can get into the urine and travel up to the bladder, causing inflammation and infection. This usually happens because of:  Wiping improperly after urinating. Always wipe from front to back.  Bowel " incontinence  Pregnancy  Procedures such as having a catheter inserted  Older age  Not emptying your bladder. This can allow bacteria a chance to grow in your urine.  Dehydration  Constipation  Sex  Use of a diaphragm for birth control   Treatment  Bladder infections are diagnosed by a urine test. They are treated with antibiotics and usually clear up quickly without complications. Treatment helps prevent a more serious kidney infection.  Medicines  Medicines can help in the treatment of a bladder infection:  Take antibiotics until they are used up, even if you feel better. It is important to finish them to make sure the infection has cleared.  You can use acetaminophen or ibuprofen for pain, fever, or discomfort, unless another medicine was prescribed. If you have chronic liver or kidney disease, talk with your healthcare provider before using these medicines. Also talk with your provider if you've ever had a stomach ulcer or gastrointestinal bleeding, or are taking blood-thinner medicines.  If you are given phenazopydridine to reduce burning with urination, it will cause your urine to become a bright orange color. This can stain clothing.  Care and prevention  These self-care steps can help prevent future infections:  Drink plenty of fluids to prevent dehydration and flush out your bladder. Do this unless you must restrict fluids for other health reasons, or your doctor told you not to.  Proper cleaning after going to the bathroom is important. Wipe from front to back after using the toilet to prevent the spread of bacteria.  Urinate more often. Don't try to hold urine in for a long time.  Wear loose-fitting clothes and cotton underwear. Avoid tight-fitting pants.  Improve your diet and prevent constipation. Eat more fresh fruit and vegetables, and fiber, and less junk and fatty foods.  Avoid sex until your symptoms are gone.  Avoid caffeine, alcohol, and spicy foods. These can irritate your bladder.  Urinate  right after intercourse to flush out your bladder.  If you use birth control pills and have frequent bladder infections, discuss it with your doctor.  Follow-up care  Call your healthcare provider if all symptoms are not gone after 3 days of treatment. This is especially important if you have repeat infections.  If a culture was done, you will be told if your treatment needs to be changed. If directed, you can call to find out the results.  If X-rays were done, you will be told if the results will affect your treatment.  Call 911  Call 911 if any of the following occur:  Trouble breathing  Hard to wake up or confusion  Fainting or loss of consciousness  Rapid heart rate  When to seek medical advice  Call your healthcare provider right away if any of these occur:  Fever of 100.4ºF (38.0ºC) or higher, or as directed by your healthcare provider  Symptoms are not better by the third day of treatment  Back or belly (abdominal) pain that gets worse  Repeated vomiting, or unable to keep medicine down  Weakness or dizziness  Vaginal discharge  Pain, redness, or swelling in the outer vaginal area (labia)  Date Last Reviewed: 10/1/2016  © 9173-5674 [employer]. [address] [place] [address], [place] [address]. All rights reserved. This information is not intended as a substitute for professional medical care. Always follow your healthcare professional's instructions.

## 2023-08-19 NOTE — PROGRESS NOTES
"Subjective:      Patient ID: Fifi Castano is a 73 y.o. female.    Vitals:  height is 5' 7" (1.702 m) and weight is 65.8 kg (145 lb). Her temperature is 98.7 °F (37.1 °C). Her blood pressure is 147/83 (abnormal) and her pulse is 65. Her respiration is 19 and oxygen saturation is 98%.     Chief Complaint: Urinary Urgency    73-year-old female with a history of SVT/post ablation April 2023, hypertension, hyperlipidemia, hypothyroidism, chronic headaches on Celexa 20 mg, and other comorbidities who presents to urgent care clinic with her  who is a retired GI physician for evaluation.  She states she  Underwent right rotator cuff surgery 2 weeks ago and was on one-week course of doxycycline 100 mg q.12 hours x7 days.  Finished 1 week with ago.  Three days ago developed urinary frequency/urgency and cloudy urine mostly in the morning.  Does have some bladder pressure sensation with urination.  Has not taking anything for symptoms.  No hematuria, fever, chills, nausea, vomiting, or any other complaints.  Does have some mild vaginal irritation when wiping impossible yellow color on toilet paper.  Did not see any vaginal discharge.  Of note, no catheter was placed her previous surgery.    Urinary Frequency   This is a new problem. The current episode started in the past 7 days. The problem occurs intermittently. The problem has been gradually worsening. The pain is at a severity of 3/10. The pain is mild. There has been no fever. She is Sexually active. Associated symptoms include frequency and urgency. Pertinent negatives include no behavior changes, chills, discharge, flank pain, hematuria, hesitancy, nausea, possible pregnancy, sweats, vomiting, weight loss, bubble bath use, constipation, rash or withholding. She has tried nothing for the symptoms. There is no history of catheterization, diabetes mellitus or kidney stones.       Constitution: Negative for activity change, appetite change, chills, sweating, " fatigue, fever and generalized weakness.   HENT:  Negative for ear pain, hearing loss, facial swelling, congestion, postnasal drip, sinus pain, sinus pressure, sore throat, trouble swallowing and voice change.    Neck: Negative for neck pain, neck stiffness and painful lymph nodes.   Cardiovascular:  Negative for chest pain, leg swelling, palpitations, sob on exertion and passing out.   Eyes:  Negative for eye discharge, eye pain, photophobia, vision loss, double vision and blurred vision.   Respiratory:  Negative for chest tightness, cough, sputum production, bloody sputum, COPD, shortness of breath, stridor, wheezing and asthma.    Gastrointestinal:  Negative for abdominal pain, nausea, vomiting, constipation, diarrhea, bright red blood in stool, rectal bleeding, heartburn and bowel incontinence.   Genitourinary:  Positive for frequency, urgency and vaginal pain. Negative for dysuria, urine decreased, flank pain, bladder incontinence, hematuria, genital trauma, vaginal discharge, vaginal bleeding, vaginal odor, painful intercourse, genital sore and pelvic pain.   Musculoskeletal:  Negative for trauma, joint pain, joint swelling, abnormal ROM of joint, muscle cramps and muscle ache.   Skin:  Negative for color change, pale, rash and wound.   Allergic/Immunologic: Negative for seasonal allergies, asthma and immunocompromised state.   Neurological:  Negative for dizziness, history of vertigo, light-headedness, passing out, facial drooping, speech difficulty, coordination disturbances, loss of balance, headaches, disorientation, altered mental status, loss of consciousness, numbness, tingling and seizures.   Hematologic/Lymphatic: Negative for swollen lymph nodes, easy bruising/bleeding and trouble clotting. Does not bruise/bleed easily.   Psychiatric/Behavioral:  Negative for altered mental status and disorientation.       Objective:     Physical Exam   Constitutional: She appears well-developed. She is cooperative.   Non-toxic appearance. She does not appear ill. No distress.      Comments:Well-appearing and wearing shoulder sling brace.     HENT:   Head: Normocephalic and atraumatic.   Ears:   Right Ear: Hearing, external ear and ear canal normal.   Left Ear: Hearing, external ear and ear canal normal.   Nose: Nose normal.   Mouth/Throat: Oropharynx is clear and moist. Mucous membranes are moist. Oropharynx is clear.   Eyes: Conjunctivae, EOM and lids are normal. Right eye exhibits no discharge. Left eye exhibits no discharge. vision grossly intact gaze aligned appropriately   Neck: Neck supple. No neck rigidity present.   Cardiovascular: Normal rate, regular rhythm and normal pulses.   Pulmonary/Chest: Effort normal. No accessory muscle usage. No respiratory distress. She has no wheezes. She exhibits no tenderness.   Abdominal: Normal appearance. She exhibits no distension. There is no abdominal tenderness. There is no rebound, no guarding, no left CVA tenderness and no right CVA tenderness.   Musculoskeletal: Normal range of motion.         General: Normal range of motion.      Right lower leg: No edema.      Left lower leg: No edema.      Comments: Moves all extremities with normal tone, strength, and ROM.   Neurological: no focal deficit. She is alert and at baseline. She has normal motor skills and normal sensation. She displays no weakness, facial symmetry and normal reflexes. No cranial nerve deficit or sensory deficit. She exhibits normal muscle tone. Gait and coordination normal. Coordination normal.   Skin: Skin is warm, dry, not diaphoretic and no rash. Capillary refill takes less than 2 seconds.   Psychiatric: Her behavior is normal. Mood and thought content normal.   Nursing note and vitals reviewed.        Results for orders placed or performed in visit on 08/19/23   POCT Urinalysis, Dipstick, Automated, W/O Scope   Result Value Ref Range    POC Blood, Urine Positive (A) Negative    POC Bilirubin, Urine Negative  Negative    POC Urobilinogen, Urine normal 0.1 - 1.1    POC Ketones, Urine Negative Negative    POC Protein, Urine Negative Negative    POC Nitrates, Urine Negative Negative    POC Glucose, Urine Negative Negative    pH, UA 6.5 5 - 8    POC Specific Gravity, Urine 1.015 1.003 - 1.029    POC Leukocytes, Urine Positive (A) Negative       Assessment:     1. Vaginal yeast infection    2. Cloudy urine    3. Urinary frequency      On exam, patient is nontoxic appearing and vitals are stable.  Patient is essentially neurovascularly intact on exam.  No concern for sepsis, pyelonephritis, or urinary obstruction.       Test in clinic ordered:    urinalysis positive 5 RBC and 10 leukocytes (see above).    Urine culture sent.  We will notify patient of the results in the next 3-5 days; can receive results on [employer].   Diagnostic testing results were independently reviewed and interpreted, which were discussed in depth with patient.   CMP 04/11/2023 reviewed with good kidney function and EGFR 86 mL/min  Cardiology progress note 06/29/2023 and 04/17/2023 reviewed.    Note dictated with voice recognition software, please excuse any grammatical errors.      Plan:     Discussed antibiotic treatment for UTI empirically until urine culture returns.  Patient and  (retired physician) with like to hold off on any antibiotic treatment until urine culture returns.  They would like treatment for vaginal yeast infection instead.  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  Patient and  feels like 1 dose should not cause any issues; they will hold off on this medication until urine culture returns.  They are aware of the risk and would like to continue.    If symptoms do not improve/worsens, patient was referred back to PCP for continued outpatient workup and management.     Vaginal yeast infection  -     fluconazole  (DIFLUCAN) 150 MG Tab; Take 1 tablet (150 mg total) by mouth once. for 1 dose  Dispense: 1 tablet; Refill: 0    Cloudy urine  -     POCT Urinalysis, Dipstick, Automated, W/O Scope  -     CULTURE, URINE    Urinary frequency      Note dictated with voice recognition software, please excuse any grammatical errors.    We had shared decision making for patient's treatment. We discussed side effects/alternatives/benefits/risk and patient would like to proceed with treatment plan. We also discussed other OTC treatment recommendations.    Patient was counseled, explained with the test results meaning, expected course, and answered all of questions. They can also receive results via my chart.      Patient was instructed to return for re-evaluation with urgent care or PCP for continued outpatient workup and management if symptoms do not improve/worsening symptoms. Strict ED versus clinic precautions given in depth.   Guideline, discharge and follow-up instructions given verbally/printed; patient will also receive via [employer]. Patient verbalized understanding and agreed with the entirety of plan of care.         Additional MDM:     Heart Failure Score:   COPD = No          Patient Instructions   PLEASE READ YOUR DISCHARGE INSTRUCTIONS ENTIRELY AS IT CONTAINS IMPORTANT INFORMATION.  You were prescribed 1 dose of Diflucan for vaginal yeast.  We discussed drug interaction with Diflucan and citalopram which can cause cardiac arrhythmias.  Avoid concomitant use.    - A urine culture was sent. You will be contacted once it results and appropriate action will be taken if needed.  We will notify patient of the results in the next 3-5 days; can receive results on [employer].   - Take the pyridium three times a day with meals for 3 days if you have bladder or urinary pain. It will turn your urine orange. You do not need to take the whole prescription you can stop this once the pain is better and finish out the antibiotics  - Drink plenty  "of fluids, wipe front to back, take showers not baths, no scented soaps, wear breathable cotton underwear, urinate after sexual intercourse.   - Tylenol or Ibuprofen as directed as needed for pain.      -You must understand that you've received an Urgent Care treatment only and that you may be released before all your medical problems are known or treated. You, the patient, will arrange for follow up care as instructed. Please arrange follow up with your primary medical clinic within 2-5 days if your signs and symptoms have not resolved or worsen. Please go to the ER for worsening symptoms including worsening fever, flank pain, vomiting, unable to tolerate fluids, fatigue, etc.   - Follow up with your PCP or specialty clinic as directed in next 2-5 days for re-evaluation.  You can call [phone] to schedule an appointment with the appropriate provider.    - If your condition worsens or fails to improve we recommend that you receive another evaluation at the emergency room immediately or contact your primary medical clinic to discuss your concerns.     RED FLAGS/WARNING SYMPTOMS DISCUSSED WITH PATIENT THAT WOULD WARRANT EMERGENT MEDICAL ATTENTION. Patient aware and verbalized understanding.        Bladder Infection, Female (Adult)    Urine is normally doesn't have any bacteria in it. But bacteria can get into the urinary tract from the skin around the rectum. Or they can travel in the blood from elsewhere in the body. Once they are in your urinary tract, they can cause infection in the urethra (urethritis), the bladder (cystitis), or the kidneys (pyelonephritis).  The most common place for an infection is in the bladder. This is called a bladder infection. This is one of the most common infections in women. Most bladder infections are easily treated. They are not serious unless the infection spreads to the kidney.  The phrases "bladder infection," "UTI," and "cystitis" are often used to describe the same thing. " But they are not always the same. Cystitis is an inflammation of the bladder. The most common cause of cystitis is an infection.  Symptoms  The infection causes inflammation in the urethra and bladder. This causes many of the symptoms. The most common symptoms of a bladder infection are:  Pain or burning when urinating  Having to urinate more often than usual  Urgent need to urinate  Only a small amount of urine comes out  Blood in urine  Abdominal discomfort. This is usually in the lower abdomen above the pubic bone.  Cloudy urine  Strong- or bad-smelling urine  Unable to urinate (urinary retention)  Unable to hold urine in (urinary incontinence)  Fever  Loss of appetite  Confusion (in older adults)  Causes  Bladder infections are not contagious. You can't get one from someone else, from a toilet seat, or from sharing a bath.  The most common cause of bladder infections is bacteria from the bowels. The bacteria get onto the skin around the opening of the urethra. From there, they can get into the urine and travel up to the bladder, causing inflammation and infection. This usually happens because of:  Wiping improperly after urinating. Always wipe from front to back.  Bowel incontinence  Pregnancy  Procedures such as having a catheter inserted  Older age  Not emptying your bladder. This can allow bacteria a chance to grow in your urine.  Dehydration  Constipation  Sex  Use of a diaphragm for birth control   Treatment  Bladder infections are diagnosed by a urine test. They are treated with antibiotics and usually clear up quickly without complications. Treatment helps prevent a more serious kidney infection.  Medicines  Medicines can help in the treatment of a bladder infection:  Take antibiotics until they are used up, even if you feel better. It is important to finish them to make sure the infection has cleared.  You can use acetaminophen or ibuprofen for pain, fever, or discomfort, unless another medicine was  prescribed. If you have chronic liver or kidney disease, talk with your healthcare provider before using these medicines. Also talk with your provider if you've ever had a stomach ulcer or gastrointestinal bleeding, or are taking blood-thinner medicines.  If you are given phenazopydridine to reduce burning with urination, it will cause your urine to become a bright orange color. This can stain clothing.  Care and prevention  These self-care steps can help prevent future infections:  Drink plenty of fluids to prevent dehydration and flush out your bladder. Do this unless you must restrict fluids for other health reasons, or your doctor told you not to.  Proper cleaning after going to the bathroom is important. Wipe from front to back after using the toilet to prevent the spread of bacteria.  Urinate more often. Don't try to hold urine in for a long time.  Wear loose-fitting clothes and cotton underwear. Avoid tight-fitting pants.  Improve your diet and prevent constipation. Eat more fresh fruit and vegetables, and fiber, and less junk and fatty foods.  Avoid sex until your symptoms are gone.  Avoid caffeine, alcohol, and spicy foods. These can irritate your bladder.  Urinate right after intercourse to flush out your bladder.  If you use birth control pills and have frequent bladder infections, discuss it with your doctor.  Follow-up care  Call your healthcare provider if all symptoms are not gone after 3 days of treatment. This is especially important if you have repeat infections.  If a culture was done, you will be told if your treatment needs to be changed. If directed, you can call to find out the results.  If X-rays were done, you will be told if the results will affect your treatment.  Call 911  Call 911 if any of the following occur:  Trouble breathing  Hard to wake up or confusion  Fainting or loss of consciousness  Rapid heart rate  When to seek medical advice  Call your healthcare provider right away if any  of these occur:  Fever of 100.4ºF (38.0ºC) or higher, or as directed by your healthcare provider  Symptoms are not better by the third day of treatment  Back or belly (abdominal) pain that gets worse  Repeated vomiting, or unable to keep medicine down  Weakness or dizziness  Vaginal discharge  Pain, redness, or swelling in the outer vaginal area (labia)  Date Last Reviewed: 10/1/2016  © 1064-5072 [employer]. [address] [place] [address]. All rights reserved. This information is not intended as a substitute for professional medical care. Always follow your healthcare professional's instructions.

## 2023-08-22 ENCOUNTER — TELEPHONE (OUTPATIENT)
Dept: URGENT CARE | Facility: CLINIC | Age: 73
End: 2023-08-22
Payer: MEDICARE

## 2023-08-22 DIAGNOSIS — N30.01 ACUTE CYSTITIS WITH HEMATURIA: Primary | ICD-10-CM

## 2023-08-22 LAB — BACTERIA UR CULT: ABNORMAL

## 2023-08-22 RX ORDER — SULFAMETHOXAZOLE AND TRIMETHOPRIM 800; 160 MG/1; MG/1
1 TABLET ORAL 2 TIMES DAILY
Qty: 14 TABLET | Refills: 0 | Status: SHIPPED | OUTPATIENT
Start: 2023-08-22 | End: 2023-08-29

## 2023-08-22 NOTE — TELEPHONE ENCOUNTER
Results for orders placed or performed in visit on 08/19/23   CULTURE, URINE    Specimen: Urine, Clean Catch   Result Value Ref Range    Urine Culture, Routine PROTEUS MIRABILIS  50,000 - 99,999 cfu/ml   (A)        Susceptibility    Proteus mirabilis - CULTURE, URINE     Amp/Sulbactam <=8/4 Sensitive mcg/mL     Ampicillin <=8 Sensitive mcg/mL     Amox/K Clav'ate <=8/4 Sensitive mcg/mL     Ceftriaxone <=1 Sensitive mcg/mL     Cefazolin <=2 Sensitive mcg/mL     Ciprofloxacin <=1 Sensitive mcg/mL     Cefepime <=2 Sensitive mcg/mL     Ertapenem <=0.5 Sensitive mcg/mL     Gentamicin <=4 Sensitive mcg/mL     Levofloxacin <=2 Sensitive mcg/mL     Meropenem <=1 Sensitive mcg/mL     Piperacillin/Tazo <=16 Sensitive mcg/mL     Trimeth/Sulfa <=2/38 Sensitive mcg/mL     Tobramycin <=4 Sensitive mcg/mL   POCT Urinalysis, Dipstick, Automated, W/O Scope   Result Value Ref Range    POC Blood, Urine Positive (A) Negative    POC Bilirubin, Urine Negative Negative    POC Urobilinogen, Urine normal 0.1 - 1.1    POC Ketones, Urine Negative Negative    POC Protein, Urine Negative Negative    POC Nitrates, Urine Negative Negative    POC Glucose, Urine Negative Negative    pH, UA 6.5 5 - 8    POC Specific Gravity, Urine 1.015 1.003 - 1.029    POC Leukocytes, Urine Positive (A) Negative     Called and discussed test results with patient, she verified full name and date of birth.  Advised on positive urine culture, she was aware of the results.  She research [employer].  She denies history of recurrent UTIs, but does reports 2 weeks ago she had a rotator cuff surgery and is unsure if she had a catheter.  She denies any history of kidney problems, denies any fever or chills, she says her urine is still cloudy.  Denies any abdominal pain or back pain.  Advised for her to take Bactrim bid x 7 days, pt wishes for longer duration of treatment, and she can follow-up with her PCP in a week for re-evaluation.  She agreed with plan.  Pharmacy and  allergies verified.

## 2023-09-09 ENCOUNTER — PATIENT MESSAGE (OUTPATIENT)
Dept: ADMINISTRATIVE | Facility: OTHER | Age: 73
End: 2023-09-09
Payer: MEDICARE

## 2023-11-14 ENCOUNTER — PATIENT MESSAGE (OUTPATIENT)
Dept: ADMINISTRATIVE | Facility: HOSPITAL | Age: 73
End: 2023-11-14
Payer: MEDICARE

## 2024-01-11 DIAGNOSIS — Z00.00 ENCOUNTER FOR MEDICARE ANNUAL WELLNESS EXAM: ICD-10-CM

## 2024-01-26 ENCOUNTER — OFFICE VISIT (OUTPATIENT)
Dept: PRIMARY CARE CLINIC | Facility: CLINIC | Age: 74
End: 2024-01-26
Payer: MEDICARE

## 2024-01-26 VITALS
HEIGHT: 67 IN | HEART RATE: 87 BPM | BODY MASS INDEX: 21.97 KG/M2 | SYSTOLIC BLOOD PRESSURE: 138 MMHG | WEIGHT: 140 LBS | DIASTOLIC BLOOD PRESSURE: 88 MMHG | OXYGEN SATURATION: 98 %

## 2024-01-26 DIAGNOSIS — E03.9 HYPOTHYROIDISM (ACQUIRED): ICD-10-CM

## 2024-01-26 DIAGNOSIS — Z86.79 HISTORY OF SUPRAVENTRICULAR TACHYCARDIA: ICD-10-CM

## 2024-01-26 DIAGNOSIS — F33.0 MILD RECURRENT MAJOR DEPRESSION: ICD-10-CM

## 2024-01-26 DIAGNOSIS — E55.9 VITAMIN D DEFICIENCY: ICD-10-CM

## 2024-01-26 DIAGNOSIS — E78.2 HYPERLIPIDEMIA, MIXED: Primary | ICD-10-CM

## 2024-01-26 PROCEDURE — 99213 OFFICE O/P EST LOW 20 MIN: CPT | Mod: PBBFAC | Performed by: INTERNAL MEDICINE

## 2024-01-26 PROCEDURE — 99214 OFFICE O/P EST MOD 30 MIN: CPT | Mod: S$PBB,,, | Performed by: INTERNAL MEDICINE

## 2024-01-26 PROCEDURE — 99999 PR PBB SHADOW E&M-EST. PATIENT-LVL III: CPT | Mod: PBBFAC,,, | Performed by: INTERNAL MEDICINE

## 2024-01-26 RX ORDER — CITALOPRAM 20 MG/1
20 TABLET, FILM COATED ORAL DAILY
Qty: 90 TABLET | Refills: 3 | Status: SHIPPED | OUTPATIENT
Start: 2024-01-26

## 2024-01-26 RX ORDER — LEVOTHYROXINE SODIUM 25 UG/1
25 TABLET ORAL DAILY
Qty: 90 TABLET | Refills: 3 | Status: SHIPPED | OUTPATIENT
Start: 2024-01-26

## 2024-01-26 RX ORDER — METOPROLOL TARTRATE 100 MG/1
100 TABLET ORAL 2 TIMES DAILY PRN
Refills: 3
Start: 2024-01-26

## 2024-01-26 RX ORDER — GABAPENTIN 300 MG/1
1 CAPSULE ORAL
COMMUNITY
Start: 2023-10-26

## 2024-01-26 NOTE — PROGRESS NOTES
[facility] Note    Chief Complaint      Chief Complaint   Patient presents with    Annual Exam       History of Present Illness      Fifi Castano is a 73 y.o. female with chronic conditions of HLD, SVT, hypothyroidism, depression, vit D deficiency  who presents today for: follow up chronic conditions.  Since last visit, right rotator cuff revision with [facility] 8/2023.  Undergoing PT in [place].  Saw Dr. Liu for acute abdominal pain with unremarkable workup.  Also saw Dr. Ordonez for routine follow up earlier this month.  EKG showed normal sinus rhythm.  LDL 87.  Labs showed elevated CRP at 65.  Was having myalgias at the time.  Saw ortho who diagnosed with left knee osteoarthritis.  May have been exacerbation of PMR.   HLD: Controlled on pravastatin.  Sees Dr. Ordonez.  .    SVT: Sees Dr. Ordonez. On metoprolol as needed for rate control.  Has tried sequentially increasing dose to avoid tachycardia but feels more lethargic at higher doses.  Saw Dr. Rosas recently to discuss ablation.    Hypothyroidism: Sees Dr. Nevarez. On synthroid.  No new or worsening symptoms.  Depression: Controlled on citalopram.    Vit D deficiency: Controlled on vit D supplement.  Labs deferred this year.  Diet: Prepares own food mostly.  Started with ideal protein diet.  Drinks plenty water.  Exercise: Limited by right ankle pain.  Denies drinking and driving, drinking more than 4 drinks on occasion, drug use.     Flu shot UTD.  Td unsure.  Prevnar, pneumovax UTD 2021 here and 2018 in [place].  Shingles vaccine discussed.  Has had zostavax.  Mammogram UTD with Dr. Nevarez.  DEXA UTD.  Cscope UTD 2022 with Dr. Coffey, polyps, 3 yr interval recommended.      Past Medical History:  Past Medical History:   Diagnosis Date    High cholesterol     Hyperthyroidism        Past Surgical History:   has a past surgical history that includes colonoscopy (11/02/2016); Tonsillectomy (1959 ?); Joint replacement (7/2018); Cosmetic  surgery (2011); and Rotator cuff repair.    Family History:  family history includes Alcohol abuse in her father; Cancer in her father and mother; Hearing loss in her mother.     Social History:  Social History     Tobacco Use    Smoking status: Never    Smokeless tobacco: Never   Substance Use Topics    Alcohol use: Yes     Alcohol/week: 1.0 standard drink of alcohol     Types: 1 Drinks containing 0.5 oz of alcohol per week     Comment: 1 Martini    Drug use: Never       I personally reviewed all past medical, surgical, social and family history.    Review of Systems   Constitutional:  Negative for chills, fever and malaise/fatigue.   Respiratory:  Negative for shortness of breath.    Cardiovascular:  Negative for chest pain.   Gastrointestinal:  Negative for constipation, diarrhea, nausea and vomiting.   Skin:  Negative for rash.   Neurological:  Negative for weakness.   All other systems reviewed and are negative.       Medications:  Outpatient Encounter Medications as of 1/26/2024   Medication Sig Dispense Refill    gabapentin (NEURONTIN) 300 MG capsule 1 capsule as needed.      cholecalciferol, vitamin D3, (VITAMIN D3) 50 mcg (2,000 unit) Cap Take 1 capsule by mouth once daily.      citalopram (CELEXA) 20 MG tablet TAKE 1 TABLET BY MOUTH EVERY DAY 90 tablet 1    JINTELI 1-5 mg-mcg Tab       levothyroxine (SYNTHROID) 25 MCG tablet Take 1 tablet (25 mcg total) by mouth once daily. 90 tablet 3    metoprolol tartrate (LOPRESSOR) 100 MG tablet Take 1 tablet by mouth 2 (two) times daily.  3    mometasone 0.1% (ELOCON) 0.1 % cream       pravastatin (PRAVACHOL) 40 MG tablet Take 40 mg by mouth once daily.       valACYclovir (VALTREX) 500 MG tablet       [DISCONTINUED] celecoxib (CELEBREX) 200 MG capsule       [DISCONTINUED] FLUZONE HIGHDOSE QUAD 20-21  mcg/0.7 mL Syrg PHARMACY ADMINISTERED      [DISCONTINUED] tretinoin (RETIN-A) 0.025 % cream 1 application every evening. At bedtime       No facility-administered  "encounter medications on file as of 1/26/2024.       Allergies:  Review of patient's allergies indicates:  No Known Allergies    Health Maintenance:  Immunization History   Administered Date(s) Administered    Influenza 10/11/2015, 10/25/2019, 09/28/2020, 10/10/2022    Influenza - High Dose - PF (65 years and older) 10/10/2021, 10/10/2022, 10/10/2023    Influenza - Quadrivalent - High Dose - PF (65 years and older) 09/28/2020, 10/10/2021    Influenza - Quadrivalent - PF *Preferred* (6 months and older) 10/11/2015    Pneumococcal Conjugate - 13 Valent 10/10/2021      Health Maintenance   Topic Date Due    Hepatitis C Screening  Never done    TETANUS VACCINE  Never done    Shingles Vaccine (1 of 2) Never done    Mammogram  01/24/2024    Colorectal Cancer Screening  03/14/2025    DEXA Scan  01/24/2026    Lipid Panel  01/06/2029        Physical Exam      Vital Signs  Pulse: 87  SpO2: 98 %  BP: 138/88  BP Location: Left arm  Patient Position: Sitting  Height and Weight  Height: 5' 7" (170.2 cm)  Weight: 63.5 kg (140 lb)  BSA (Calculated - sq m): 1.73 sq meters  BMI (Calculated): 21.9  Weight in (lb) to have BMI = 25: 159.3]    Physical Exam  Vitals reviewed.   Constitutional:       Appearance: She is well-developed.   HENT:      Head: Normocephalic and atraumatic.      Right Ear: External ear normal.      Left Ear: External ear normal.   Cardiovascular:      Rate and Rhythm: Normal rate and regular rhythm.      Heart sounds: Normal heart sounds. No murmur heard.  Pulmonary:      Effort: Pulmonary effort is normal.      Breath sounds: Normal breath sounds. No wheezing or rales.   Abdominal:      General: Bowel sounds are normal. There is no distension.      Palpations: Abdomen is soft.      Tenderness: There is no abdominal tenderness.          Laboratory:  CBC:  Recent Labs   Lab 03/17/22  1332   WBC 6.1   RBC 4.32   Hemoglobin 13.9   Hematocrit 41.2   Platelets 179   MCV 95.4   MCH 32.2   MCHC 33.7     CMP:  Recent Labs "   Lab 01/06/24  0918   ALT 13   AST 20     URINALYSIS:  Recent Labs   Lab 08/19/23  1201   pH, UA 6.5      LIPIDS:  Recent Labs   Lab 03/17/21  0000 03/18/21  1219 03/17/22  1332 10/01/22  0946 01/19/23  0903   TSH  --  2.02 1.85  --   --    HDL 50  --   --  60 61   Cholesterol 171  --   --  210 H 210 H   Triglycerides  --   --   --  279 H 278 H   LDL Cholesterol 102  --   --   --   --    LDL Calculated  --   --   --  105 105   Hdl/Cholesterol Ratio  --   --   --  3.50 3.44     TSH:  Recent Labs   Lab 03/18/21  1219 03/17/22  1332   TSH 2.02 1.85     A1C:        Assessment/Plan     Fifi Castano is a 73 y.o.female with:    1. Hyperlipidemia, mixed  Continue current meds.    2. Hypothyroidism (acquired)  Continue current meds.    3. Mild recurrent major depression  Continue current meds.    4. History of supraventricular tachycardia  Continue current meds.    5. Vitamin D deficiency       Chronic conditions status updated as per HPI.  Other than changes above, cont current medications and maintain follow up with specialists.  Follow up in about 1 year (around 1/26/2025) for Annual preventative visit.    No future appointments.    Atif Duran MD  [facility]

## 2024-01-31 LAB
T4 FREE SERPL-MCNC: 1 NG/DL (ref 0.8–1.8)
TSH SERPL-ACNC: 1.73 MIU/L (ref 0.4–4.5)

## 2024-11-20 LAB — CRC RECOMMENDATION EXT: NORMAL

## 2024-11-25 ENCOUNTER — PATIENT OUTREACH (OUTPATIENT)
Dept: ADMINISTRATIVE | Facility: HOSPITAL | Age: 74
End: 2024-11-25
Payer: MEDICARE

## 2024-11-25 NOTE — PROGRESS NOTES
Health Maintenance Due   Topic Date Due    Hepatitis C Screening  Never done    TETANUS VACCINE  Never done    Shingles Vaccine (1 of 2) Never done    RSV Vaccine (Age 60+ and Pregnant patients) (1 - Risk 60-74 years 1-dose series) Never done    Pneumococcal Vaccines (Age 65+) (2 of 2 - PPSV23 or PCV20) 10/10/2022    Influenza Vaccine (1) 09/01/2024    COVID-19 Vaccine (1 - 2024-25 season) Never done    Mammogram  01/30/2025     Chart review completed.  Updated. Triggered Links. Immunizations reviewed and updated. Care Everywhere Updated. Care Team Updated.  Imported outside colonoscopy results from [facility] into /media. Repeat colonoscopy in 5 years per provider recommendation.

## 2024-12-01 DIAGNOSIS — E03.9 HYPOTHYROIDISM (ACQUIRED): ICD-10-CM

## 2024-12-01 NOTE — TELEPHONE ENCOUNTER
Care Due:                  Date            Visit Type   Department     Provider  --------------------------------------------------------------------------------                                EP -                              PRIMARY      [facility]  Last Visit: 01-      CARE ([facility])   CARE           Atif Medley                              EP -                              PRIMARY      [facility]  Next Visit: 04-      CARE ([facility])   CARE           Atif Medley                                                            Last  Test          Frequency    Reason                     Performed    Due Date  --------------------------------------------------------------------------------    TSH.........  12 months..  levothyroxine............  01- 01-    Health [facility] Embedded Care Due Messages. Reference number: [id number].   12/01/2024 11:11:06 AM CST

## 2024-12-02 RX ORDER — LEVOTHYROXINE SODIUM 25 UG/1
25 TABLET ORAL
Qty: 90 TABLET | Refills: 0 | Status: SHIPPED | OUTPATIENT
Start: 2024-12-02 | End: 2024-12-03 | Stop reason: SDUPTHER

## 2024-12-02 NOTE — TELEPHONE ENCOUNTER
Provider Staff:  Action required for this patient     Please see care gap opportunities below in Care Due Message.    Thanks!  [facility]     Appointments      Date Provider   Last Visit   1/26/2024 Atif Duran MD   Next Visit   4/7/2025 Atif Duran MD      Refill Decision Note   Fifi Castano  is requesting a refill authorization.  Brief Assessment and Rationale for Refill:  Approve     Medication Therapy Plan:         Comments:     Note composed:1:35 AM 12/02/2024

## 2024-12-03 ENCOUNTER — OFFICE VISIT (OUTPATIENT)
Dept: PRIMARY CARE CLINIC | Facility: CLINIC | Age: 74
End: 2024-12-03
Payer: MEDICARE

## 2024-12-03 VITALS — DIASTOLIC BLOOD PRESSURE: 78 MMHG | HEART RATE: 89 BPM | OXYGEN SATURATION: 94 % | SYSTOLIC BLOOD PRESSURE: 138 MMHG

## 2024-12-03 DIAGNOSIS — Z79.890 POSTMENOPAUSAL HRT (HORMONE REPLACEMENT THERAPY): ICD-10-CM

## 2024-12-03 DIAGNOSIS — E03.9 HYPOTHYROIDISM (ACQUIRED): Primary | ICD-10-CM

## 2024-12-03 DIAGNOSIS — F33.0 MILD RECURRENT MAJOR DEPRESSION: ICD-10-CM

## 2024-12-03 DIAGNOSIS — E55.9 VITAMIN D DEFICIENCY: ICD-10-CM

## 2024-12-03 DIAGNOSIS — Z11.59 ENCOUNTER FOR HEPATITIS C SCREENING TEST FOR LOW RISK PATIENT: ICD-10-CM

## 2024-12-03 DIAGNOSIS — E78.2 HYPERLIPIDEMIA, MIXED: ICD-10-CM

## 2024-12-03 PROCEDURE — 99214 OFFICE O/P EST MOD 30 MIN: CPT | Mod: S$PBB,,, | Performed by: NURSE PRACTITIONER

## 2024-12-03 PROCEDURE — 99213 OFFICE O/P EST LOW 20 MIN: CPT | Mod: PBBFAC | Performed by: NURSE PRACTITIONER

## 2024-12-03 PROCEDURE — 99999 PR PBB SHADOW E&M-EST. PATIENT-LVL III: CPT | Mod: PBBFAC,,, | Performed by: NURSE PRACTITIONER

## 2024-12-03 RX ORDER — CITALOPRAM 20 MG/1
20 TABLET, FILM COATED ORAL DAILY
Qty: 90 TABLET | Refills: 3 | Status: SHIPPED | OUTPATIENT
Start: 2024-12-03

## 2024-12-03 RX ORDER — LEVOTHYROXINE SODIUM 25 UG/1
25 TABLET ORAL
Qty: 90 TABLET | Refills: 3 | Status: SHIPPED | OUTPATIENT
Start: 2024-12-03

## 2024-12-03 NOTE — PROGRESS NOTES
[facility] Note    Chief Complaint      Chief Complaint   Patient presents with    Medication Refill       History of Present Illness      History of Present Illness    CHIEF COMPLAINT:  Fifi presents today for prescription renewals and lab work.    MEDICATIONS:  She is currently taking Levothyroxine, Celexa, Pravastatin, Gabapentin, Vitamin D, and Valtrex. She has been on Celexa and Levothyroxine for approximately 25 years.    MEDICAL HISTORY:  She has a history of supraventricular tachycardia (SVT), treated with ablation last April. She also has a history of bilateral rotator cuff surgeries, with a revision on the right shoulder in August of last year and left shoulder surgery in 2014. She is currently attending physical therapy at [facility] for lower back issues.    ROS:  Musculoskeletal: +back pain       Needs labs and medication refills before going back to [place].     HLD: Controlled on pravastatin.  Sees Dr. Ordonez.  LDL 78..    SVT: Sees Dr. Ordonez.  had ablation with Dr. Rosas last year, has not needed the metoprolol since.   Hypothyroidism: Sees Dr. Nevarez. On synthroid.  No new or worsening symptoms.  Depression: Controlled on citalopram.  denies si/hi/panic attacks.  Vit D deficiency: Controlled on vit D supplement.    Diet: Prepares own food mostly.  Drinks plenty water.  Exercise: doing Pilates 3x week, enjoys gardening.   Denies drinking and driving, drinking more than 4 drinks on occasion, drug use.       Flu shot UTD.  Td unsure.  Prevnar, pneumovax UTD 2021 here and 2018 in [place].  Shingles vaccine discussed.  Has had zostavax.  Mammogram UTD with Dr. Nevarez.  DEXA UTD.  Cscope UTD 2024 with Dr. Coffey, polyps, 5 yr interval recommended.     Past Medical History:  Past Medical History:   Diagnosis Date    High cholesterol     Hyperthyroidism        Past Surgical History:   has a past surgical history that includes colonoscopy (11/02/2016); Tonsillectomy (1959 ?); Joint replacement  (7/2018); Cosmetic surgery (2011); and Rotator cuff repair.    Family History:  family history includes Alcohol abuse in her father; Cancer in her father and mother; Hearing loss in her mother.     Social History:  Social History     Tobacco Use    Smoking status: Never    Smokeless tobacco: Never   Substance Use Topics    Alcohol use: Yes     Alcohol/week: 1.0 standard drink of alcohol     Types: 1 Drinks containing 0.5 oz of alcohol per week     Comment: 1 Martini    Drug use: Never         Medications:  Outpatient Encounter Medications as of 12/3/2024   Medication Sig Dispense Refill    cholecalciferol, vitamin D3, (VITAMIN D3) 50 mcg (2,000 unit) Cap Take 1 capsule by mouth once daily.      citalopram (CELEXA) 20 MG tablet Take 1 tablet (20 mg total) by mouth once daily. 90 tablet 3    gabapentin (NEURONTIN) 300 MG capsule 1 capsule as needed.      JINTELI 1-5 mg-mcg Tab       levothyroxine (SYNTHROID) 25 MCG tablet Take 1 tablet (25 mcg total) by mouth before breakfast. 90 tablet 3    pravastatin (PRAVACHOL) 40 MG tablet Take 40 mg by mouth once daily.       valACYclovir (VALTREX) 500 MG tablet       [DISCONTINUED] citalopram (CELEXA) 20 MG tablet Take 1 tablet (20 mg total) by mouth once daily. 90 tablet 3    [DISCONTINUED] levothyroxine (SYNTHROID) 25 MCG tablet Take 1 tablet (25 mcg total) by mouth once daily. 90 tablet 3    [DISCONTINUED] levothyroxine (SYNTHROID) 25 MCG tablet TAKE 1 TABLET BY MOUTH ONCE DAILY. 90 tablet 0    [DISCONTINUED] metoprolol tartrate (LOPRESSOR) 100 MG tablet Take 1 tablet (100 mg total) by mouth 2 (two) times daily as needed (palpitations).  3    [DISCONTINUED] mometasone 0.1% (ELOCON) 0.1 % cream        No facility-administered encounter medications on file as of 12/3/2024.       Allergies:  Review of patient's allergies indicates:  No Known Allergies    Health Maintenance:  Immunization History   Administered Date(s) Administered    Influenza 10/11/2015, 10/25/2019,  09/28/2020, 10/10/2022    Influenza - Quadrivalent - High Dose - PF (65 years and older) 09/28/2020, 10/10/2021    Influenza - Quadrivalent - PF *Preferred* (6 months and older) 10/11/2015    Influenza - Trivalent - Fluzone High Dose - PF (65 years and older) 10/10/2021, 10/10/2022, 10/10/2023    Pneumococcal Conjugate - 13 Valent 10/10/2021      Health Maintenance   Topic Date Due    Hepatitis C Screening  Never done    TETANUS VACCINE  Never done    Shingles Vaccine (1 of 2) Never done    RSV Vaccine (Age 60+ and Pregnant patients) (1 - Risk 60-74 years 1-dose series) Never done    Pneumococcal Vaccines (Age 65+) (2 of 2 - PPSV23 or PCV20) 10/10/2022    Influenza Vaccine (1) 09/01/2024    COVID-19 Vaccine (1 - 2024-25 season) Never done    Mammogram  01/30/2025    DEXA Scan  01/24/2026    Lipid Panel  01/06/2029    Colorectal Cancer Screening  11/20/2029        Physical Exam      Vital Signs  Pulse: 89  SpO2: (!) 94 %  BP: 138/78  BP Location: Right arm  Patient Position: Sitting  Pain Score: 0-No pain]    Physical Exam  Constitutional:       Appearance: She is well-developed.   HENT:      Head: Normocephalic and atraumatic.   Cardiovascular:      Rate and Rhythm: Normal rate and regular rhythm.      Heart sounds: Normal heart sounds. No murmur heard.  Pulmonary:      Effort: Pulmonary effort is normal. No respiratory distress.      Breath sounds: Normal breath sounds.   Abdominal:      General: There is no distension.      Palpations: Abdomen is soft.      Tenderness: There is no abdominal tenderness. There is no guarding.   Skin:     General: Skin is warm and dry.   Neurological:      Mental Status: She is alert. Mental status is at baseline.   Psychiatric:         Behavior: Behavior normal.          Laboratory:  CBC:  Recent Labs   Lab 03/17/22  1332   WBC 6.1   RBC 4.32   Hemoglobin 13.9   Hematocrit 41.2   Platelets 179   MCV 95.4   MCH 32.2   MCHC 33.7     CMP:  Recent Labs   Lab 01/06/24  0922   Glucose 90    Calcium 9.4   Albumin Level 4.2   Sodium 142   Potassium 4.4   Carbon Dioxide 27   Blood Urea Nitrogen 12   Alkaline Phosphatase 73   ALT 14   AST 19   Total Bilirubin 0.5     URINALYSIS:  Recent Labs   Lab 08/19/23  1201   pH, UA 6.5      LIPIDS:  Recent Labs   Lab 03/17/22  1332 10/01/22  0946 01/19/23  0903 01/30/24  1455   TSH 1.85  --   --  1.73   HDL  --  60 61  --    Cholesterol  --  210 H 210 H  --    Triglycerides  --  279 H 278 H  --    LDL Calculated  --  105 105  --    Hdl/Cholesterol Ratio  --  3.50 3.44  --      TSH:  Recent Labs   Lab 03/17/22  1332 01/30/24  1455   TSH 1.85 1.73     A1C:        Assessment/Plan     Fifi Castano is a 74 y.o.female with:    Assessment & Plan    IMPRESSION:  - Reviewed medication regimen, including recent discontinuation of metoprolol following successful SVT ablation  - Assessed preventive care measures, including immunizations and cancer screenings  - Noted ongoing physical therapy for lower back issues and history of shoulder surgeries    HYPOTHYROIDISM:  - Continued Synthroid (levothyroxine).  - Refilled Synthroid (levothyroxine) and Celexa, sending prescriptions to [facility] in [place].    MAJOR DEPRESSIVE DISORDER:  - Continued Celexa.  - Refilled Synthroid (levothyroxine) and Celexa, sending prescriptions to [facility] in [place].    LOW BACK PAIN:  - Fifi to proceed with ongoing physical therapy for lower back at [facility].    GENERAL HEALTH MAINTENANCE:  - Fifi to continue karate training 3 times per week.    FOLLOW-UP:  - Contact the office in November or December to schedule next year's annual visit with Dr. Duran.  - A recall will be set up in the system to remind patient to schedule the appointment.         1. Hypothyroidism (acquired)  - levothyroxine (SYNTHROID) 25 MCG tablet; Take 1 tablet (25 mcg total) by mouth before breakfast.  Dispense: 90 tablet; Refill: 3  - T4, FREE; Future  - TSH; Future  - T4, FREE  -  TSH    2. Hyperlipidemia, mixed  - COMPREHENSIVE METABOLIC PANEL; Future  - LIPID PANEL; Future  - COMPREHENSIVE METABOLIC PANEL  - LIPID PANEL    3. Postmenopausal HRT (hormone replacement therapy)  - CBC W/ AUTO DIFFERENTIAL; Future  - CBC W/ AUTO DIFFERENTIAL    4. Encounter for hepatitis C screening test for low risk patient  - HEPATITIS C ANTIBODY; Future  - HEPATITIS C ANTIBODY    5. Mild recurrent major depression  - citalopram (CELEXA) 20 MG tablet; Take 1 tablet (20 mg total) by mouth once daily.  Dispense: 90 tablet; Refill: 3    6. Vitamin D deficiency       Chronic conditions status updated as per HPI.  Other than changes above, cont current medications and maintain follow up with specialists.  No follow-ups on file.    Future Appointments   Date Time Provider Department Center   4/7/2025  7:30 AM Atif Duran MD [facility]       This note was generated with the assistance of ambient listening technology. Verbal consent was obtained by the patient and accompanying visitor(s) for the recording of patient appointment to facilitate this note. I attest to having reviewed and edited the generated note for accuracy, though some syntax or spelling errors may persist. Please contact the author of this note for any clarification.      Adrienne Cotaya, FNP [facility]

## 2024-12-12 LAB
ALBUMIN SERPL-MCNC: 4.4 G/DL (ref 3.6–5.1)
ALBUMIN/GLOB SERPL: 2.1 (CALC) (ref 1–2.5)
ALP SERPL-CCNC: 74 U/L (ref 37–153)
ALT SERPL-CCNC: 14 U/L (ref 6–29)
AST SERPL-CCNC: 22 U/L (ref 10–35)
BASOPHILS # BLD AUTO: 40 CELLS/UL (ref 0–200)
BASOPHILS NFR BLD AUTO: 1 %
BILIRUB SERPL-MCNC: 0.5 MG/DL (ref 0.2–1.2)
BUN SERPL-MCNC: 19 MG/DL (ref 7–25)
BUN/CREAT SERPL: NORMAL (CALC) (ref 6–22)
CALCIUM SERPL-MCNC: 9.2 MG/DL (ref 8.6–10.4)
CHLORIDE SERPL-SCNC: 107 MMOL/L (ref 98–110)
CHOLEST SERPL-MCNC: 178 MG/DL
CHOLEST/HDLC SERPL: 2.4 (CALC)
CO2 SERPL-SCNC: 30 MMOL/L (ref 20–32)
CREAT SERPL-MCNC: 0.7 MG/DL (ref 0.6–1)
EGFR: 92 ML/MIN/1.73M2
EOSINOPHIL # BLD AUTO: 80 CELLS/UL (ref 15–500)
EOSINOPHIL NFR BLD AUTO: 2 %
ERYTHROCYTE [DISTWIDTH] IN BLOOD BY AUTOMATED COUNT: 11.3 % (ref 11–15)
GLOBULIN SER CALC-MCNC: 2.1 G/DL (CALC) (ref 1.9–3.7)
GLUCOSE SERPL-MCNC: 99 MG/DL (ref 65–99)
HCT VFR BLD AUTO: 41.1 % (ref 35–45)
HCV AB SERPL QL IA: NORMAL
HDLC SERPL-MCNC: 75 MG/DL
HGB BLD-MCNC: 14.1 G/DL (ref 11.7–15.5)
LDLC SERPL CALC-MCNC: 78 MG/DL (CALC)
LYMPHOCYTES # BLD AUTO: 1200 CELLS/UL (ref 850–3900)
LYMPHOCYTES NFR BLD AUTO: 30 %
MCH RBC QN AUTO: 33.3 PG (ref 27–33)
MCHC RBC AUTO-ENTMCNC: 34.3 G/DL (ref 32–36)
MCV RBC AUTO: 97.2 FL (ref 80–100)
MONOCYTES # BLD AUTO: 328 CELLS/UL (ref 200–950)
MONOCYTES NFR BLD AUTO: 8.2 %
NEUTROPHILS # BLD AUTO: 2352 CELLS/UL (ref 1500–7800)
NEUTROPHILS NFR BLD AUTO: 58.8 %
NONHDLC SERPL-MCNC: 103 MG/DL (CALC)
PLATELET # BLD AUTO: 189 THOUSAND/UL (ref 140–400)
PMV BLD REES-ECKER: 10 FL (ref 7.5–12.5)
POTASSIUM SERPL-SCNC: 4.9 MMOL/L (ref 3.5–5.3)
PROT SERPL-MCNC: 6.5 G/DL (ref 6.1–8.1)
RBC # BLD AUTO: 4.23 MILLION/UL (ref 3.8–5.1)
SODIUM SERPL-SCNC: 142 MMOL/L (ref 135–146)
T4 FREE SERPL-MCNC: 1 NG/DL (ref 0.8–1.8)
TRIGL SERPL-MCNC: 158 MG/DL
TSH SERPL-ACNC: 2.29 MIU/L (ref 0.4–4.5)
WBC # BLD AUTO: 4 THOUSAND/UL (ref 3.8–10.8)

## 2025-02-06 DIAGNOSIS — E03.9 HYPOTHYROIDISM (ACQUIRED): ICD-10-CM

## 2025-02-06 DIAGNOSIS — F33.0 MILD RECURRENT MAJOR DEPRESSION: ICD-10-CM

## 2025-02-06 RX ORDER — CITALOPRAM 20 MG/1
20 TABLET, FILM COATED ORAL DAILY
Qty: 90 TABLET | Refills: 3 | Status: SHIPPED | OUTPATIENT
Start: 2025-02-06

## 2025-02-06 RX ORDER — LEVOTHYROXINE SODIUM 25 UG/1
25 TABLET ORAL
Qty: 90 TABLET | Refills: 3 | Status: SHIPPED | OUTPATIENT
Start: 2025-02-06

## 2025-02-24 DIAGNOSIS — Z00.00 ENCOUNTER FOR MEDICARE ANNUAL WELLNESS EXAM: ICD-10-CM
